# Patient Record
Sex: MALE | Race: WHITE | URBAN - METROPOLITAN AREA
[De-identification: names, ages, dates, MRNs, and addresses within clinical notes are randomized per-mention and may not be internally consistent; named-entity substitution may affect disease eponyms.]

---

## 2021-06-09 ENCOUNTER — HISTORICAL (OUTPATIENT)
Dept: ADMINISTRATIVE | Facility: HOSPITAL | Age: 86
End: 2021-06-09

## 2021-08-04 ENCOUNTER — HISTORICAL (OUTPATIENT)
Dept: ADMINISTRATIVE | Facility: HOSPITAL | Age: 86
End: 2021-08-04

## 2022-04-30 VITALS
BODY MASS INDEX: 23.84 KG/M2 | HEIGHT: 73 IN | WEIGHT: 179.88 LBS | DIASTOLIC BLOOD PRESSURE: 91 MMHG | SYSTOLIC BLOOD PRESSURE: 188 MMHG | DIASTOLIC BLOOD PRESSURE: 97 MMHG | SYSTOLIC BLOOD PRESSURE: 182 MMHG | BODY MASS INDEX: 23.84 KG/M2 | HEIGHT: 73 IN | WEIGHT: 179.88 LBS

## 2022-05-03 NOTE — HISTORICAL [facility]
This is a historical note converted from Shelley. Formatting and pictures may have been removed.  Please reference Shelley for original formatting and attached multimedia. Chief Complaint  3 MONTH F/U PINNING RIGHT FEM NECK FX, ?AMBULATING WITH CANE, NO COMPLAINTS  History of Present Illness  Patient is here today for a follow-up evaluation 3 months out for percutaneous pinning of right femoral neck fracture. ?He states he is doing very well.? He is ambulatory with a cane. ?He states that he is able to walk without his cane but carries it around for balance. ?He continues to work with home health physical therapy once a week and does?home exercises on the other days.? He has no pain?or any other complaints or issues to report today. ?He is very happy with his progress.  Review of Systems  Otherwise negative  Physical Exam  Vitals & Measurements  HR:?89(Peripheral)? RR:?20? BP:?188/97?  HT:?185.40?cm? WT:?81.600?kg? BMI:?23.74?  General: Awake, alert, oriented. Patient in no acute distress. Well nourished and well perfused.  Musculoskeletal:?  Right hip:  Incisions well-healed with no signs of infection  Good passive range of motion of the hip and knee without pain  No swelling to the lower extremity  No painful or prominent hardware  calf supple, non tender, no signs of deep vein thrombosis  dorsi and plantar flexion intact  motor intact to digits  palpable DP pulse  sensation to light touch intact distally  brisk capillary refill distally  Assessment/Plan  1.?Closed nondisplaced midcervical fracture of right femur?S72.034D  Ordered:  Clinic Follow up, *Est. 10/04/21 3:00:00 CDT, Order for future visit, Closed nondisplaced midcervical fracture of right femur, Orthopaedics  Office/Outpatient Visit Level 2 Established [id number] PC, Closed nondisplaced midcervical fracture of right femur, Orthopaedics Clinic, 08/04/21 13:23:00 CDT  XR Hip Right 2 Views, Routine, *Est. 10/04/21 3:00:00 CDT, None, Ambulatory, Rad Type,  Order for future visit, Closed nondisplaced midcervical fracture of right femur, Not Scheduled, *Est. 10/04/21 3:00:00 CDT  ?  Patient?is?doing very well 3 months out from percutaneous pinning of his?right femoral neck fracture. ?His x-rays are stable today?and his fracture appears to be healing appropriately. ?He continue activity as tolerated to the right lower extremity. ?Okay to wean from his cane?once his balance permits. ?He will continue to work with home health physical therapy once weekly for the next 2 months?and do home exercise program on his off days.? He is working on range of motion and strengthening exercises as well as gait training.? We will plan to see him back in 2 months for repeat x-rays and evaluation. ?All questions and concerns were addressed. ?The patient and his wife are very happy with his progress and they understand and agree with the plan of care.  ?  The above findings, diagnostics, and treatment plan were discussed with Dr. Lerma who is in agreement with the plan of care.?  Referrals  Clinic Follow up, *Est. 10/04/21 3:00:00 CDT, Order for future visit, Closed nondisplaced midcervical fracture of right femur, [facility]   Problem List/Past Medical History  Ongoing  Closed nondisplaced midcervical fracture of right femur  Historical  No qualifying data  Procedure/Surgical History  Pinning Percutaneous Hip (Right) (05/03/2021)  Reposition Right Upper Femur with Internal Fixation Device, Percutaneous Approach (05/03/2021)   Medications  gabapentin 100 mg oral capsule, 200 mg= 2 cap(s), Oral, QID  Keppra 500 mg oral tablet, 500 mg= 1 tab(s), Oral, BID,? ?Not Taking, Completed Rx  Pepcid 20 mg oral tablet, 20 mg= 1 tab(s), Oral, Daily  Senokot S 50 mg-8.6 mg oral tablet, 2 tab(s), Oral, BID,? ?Not Taking, Completed Rx  Allergies  No Known Allergies  Social History  Abuse/Neglect  No, 08/04/2021  Tobacco  Cigars or pipes daily within last 30 days, No, 08/04/2021  Family  History  Family history is negative  Health Maintenance  Health Maintenance  ???Pending?(in the next year)  ??? ??OverDue  ??? ? ? ?Influenza Vaccine due??10/01/20??and every 1??day(s)  ??? ? ? ?Smoking Cessation due??01/01/21??Variable frequency  ??? ? ? ?Cognitive Screening due??01/02/21??and every 1??year(s)  ??? ??Due?  ??? ? ? ?Lipid Screening due??08/04/21??Unknown Frequency  ??? ? ? ?Medicare Annual Wellness Exam due??08/04/21??and every 1??year(s)  ??? ? ? ?Pneumococcal Vaccine due??08/04/21??Unknown Frequency  ??? ? ? ?Tetanus Vaccine due??08/04/21??and every 10??year(s)  ??? ? ? ?Zoster Vaccine due??08/04/21??Unknown Frequency  ??? ??Due In Future?  ??? ? ? ?Obesity Screening not due until??01/01/22??and every 1??year(s)  ??? ? ? ?Advance Directive not due until??01/02/22??and every 1??year(s)  ??? ? ? ?Fall Risk Assessment not due until??01/02/22??and every 1??year(s)  ??? ? ? ?Functional Assessment not due until??01/02/22??and every 1??year(s)  ??? ? ? ?ADL Screening not due until??05/08/22??and every 1??year(s)  ???Satisfied?(in the past 1 year)  ??? ??Satisfied?  ??? ? ? ?ADL Screening on??05/08/21.??Satisfied by Shmuel Arauz  ??? ? ? ?Advance Directive on??05/02/21.??Satisfied by Birdie Alberto RN  ??? ? ? ?Blood Pressure Screening on??08/04/21.??Satisfied by Jaz Al  ??? ? ? ?Body Mass Index Check on??08/04/21.??Satisfied by Jaz Al  ??? ? ? ?Depression Screening on??08/04/21.??Satisfied by Jaz Al  ??? ? ? ?Diabetes Screening on??05/05/21.??Satisfied by Stephy Cid  ??? ? ? ?Fall Risk Assessment on??08/04/21.??Satisfied by Jaz Al  ??? ? ? ?Functional Assessment on??05/07/21.??Satisfied by Paige Ortiz PT  ??? ? ? ?Obesity Screening on??08/04/21.??Satisfied by Jaz Al  ?  Diagnostic Results  AP and lateral x-rays of the right hip?and AP of the pelvis: Hardware intact no failure loosening;?alignment maintained  compared to previous x-rays;?interval bone healing noted      Patient evaluated and discussed with Idania Hogan NP. I agree with her assessment and plan of care with any exceptions or additions noted.

## 2022-05-03 NOTE — HISTORICAL [facility]
This is a historical note converted from Shelley. Formatting and pictures may have been removed.  Please reference Shelley for original formatting and attached multimedia. Chief Complaint  5 week f/u crpp righ hip, ambulating with walker.  History of Present Illness  ?  Patient is here today for follow-up evaluation?status post right hip?percutaneous screw fixation?for valgus impacted femoral neck fracture.? He is doing very well today. ?He ambulates well with a walker. ?He has been working with physical therapy at home. ?He has no discomfort in the hip?to speak of.? He is happy with his progress.  ?  Review of Systems  ?  Otherwise negative  ?  Physical Exam  Vitals & Measurements  T:?36.7? ?C (Oral)? HR:?79(Peripheral)? RR:?20? BP:?182/91?  HT:?185.40?cm? WT:?81.600?kg? BMI:?23.74?  ?  Right lower extremity:?Surgical incision is well-healed.? Good range of motion of the hip, mild discomfort at the extremes of internal rotation. ?No calf swelling or tenderness, no signs of DVT. ?5 out of 5 motor strength distally. ?Ambulates well with his walker.  ?  Assessment/Plan  1.?Closed nondisplaced midcervical fracture of right femur?S72.034D  Ordered:  Clinic Follow up, *Est. 08/09/21 3:00:00 CDT, Order for future visit, Closed nondisplaced midcervical fracture of right femur, Orth\[facility]\""edics  Post-Op follow-up visit [id number] PC, Closed nondisplaced midcervical fracture of right femur, [facility], 06/09/21 14:04:00 CDT  XR Hip Right 2 Views, Routine, *Est. 08/09/21 3:00:00 CDT, Trauma, None, Ambulatory, Rad Type, Order for future visit, Closed nondisplaced midcervical fracture of right femur, Not Scheduled, *Est. 08/09/21 3:00:00 CDT  ?  ?  He is doing very well today?and I am very happy with his progress. ?He can continue to work with physical therapy on strengthening and range of motion.? Continue the use of the walker for balance. ?They understand that he is at risk for developing a nonunion of the femoral  neck?which could?collapse and require future operative intervention. ?We will continue to monitor?closely for this.? He will return to see us in 2 months for repeat x-rays of the right hip. ?He understands and agrees with all that we have discussed and all questions and concerns were addressed.  ?  Referrals  Clinic Follow up, *Est. 08/09/21 3:00:00 CDT, Order for future visit, Closed nondisplaced midcervical fracture of right femur, [facility]   Problem List/Past Medical History  Ongoing  Closed nondisplaced midcervical fracture of right femur  Historical  No qualifying data  Procedure/Surgical History  Pinning Percutaneous Hip (Right) (05/03/2021)  Reposition Right Upper Femur with Internal Fixation Device, Percutaneous Approach (05/03/2021)   Medications  acetaminophen-hydrocodone 325 mg-5 mg oral tablet, 1 tab(s), Oral, q4hr, PRN,? ?Not Taking, Completed Rx  gabapentin 100 mg oral capsule, 200 mg= 2 cap(s), Oral, QID  ibuprofen 600 mg oral tablet, 600 mg= 1 tab(s), Oral, TID, PRN,? ?Not Taking, Completed Rx  Keppra 500 mg oral tablet, 500 mg= 1 tab(s), Oral, BID,? ?Not Taking, Completed Rx  Lovenox, 30 mg= 0.3 mL, Subcutaneous, BID,? ?Not Taking, Completed Rx  Pepcid 20 mg oral tablet, 20 mg= 1 tab(s), Oral, Daily  Senokot S 50 mg-8.6 mg oral tablet, 2 tab(s), Oral, BID,? ?Not Taking, Completed Rx  traMADol 50 mg oral tablet, 50 mg= 1 tab(s), Oral, TID, PRN,? ?Not Taking, Completed Rx  Tylenol 325 mg oral tablet, 650 mg= 2 tab(s), Oral, q8hr,? ?Not Taking, Completed Rx  Allergies  No Known Allergies  Social History  Abuse/Neglect  No, 06/09/2021  Tobacco  Cigars or pipes daily within last 30 days, No, 06/09/2021  Family History  Family history is negative  Health Maintenance  Health Maintenance  ???Pending?(in the next year)  ??? ??OverDue  ??? ? ? ?Influenza Vaccine due??10/01/20??and every 1??day(s)  ??? ? ? ?Smoking Cessation due??01/01/21??Variable frequency  ??? ? ? ?Cognitive Screening  due??01/02/21??and every 1??year(s)  ??? ??Due?  ??? ? ? ?Lipid Screening due??06/09/21??Unknown Frequency  ??? ? ? ?Medicare Annual Wellness Exam due??06/09/21??and every 1??year(s)  ??? ? ? ?Pneumococcal Vaccine due??06/09/21??Unknown Frequency  ??? ? ? ?Tetanus Vaccine due??06/09/21??and every 10??year(s)  ??? ? ? ?Zoster Vaccine due??06/09/21??Unknown Frequency  ??? ??Due In Future?  ??? ? ? ?Obesity Screening not due until??01/01/22??and every 1??year(s)  ??? ? ? ?Advance Directive not due until??01/02/22??and every 1??year(s)  ??? ? ? ?Fall Risk Assessment not due until??01/02/22??and every 1??year(s)  ??? ? ? ?Functional Assessment not due until??01/02/22??and every 1??year(s)  ??? ? ? ?ADL Screening not due until??05/08/22??and every 1??year(s)  ???Satisfied?(in the past 1 year)  ??? ??Satisfied?  ??? ? ? ?ADL Screening on??05/08/21.??Satisfied by Shmuel Arauz  ??? ? ? ?Advance Directive on??05/02/21.??Satisfied by Birdie Alberto RN  ??? ? ? ?Blood Pressure Screening on??06/09/21.??Satisfied by Jaz Al  ??? ? ? ?Body Mass Index Check on??06/09/21.??Satisfied by Jaz Al  ??? ? ? ?Depression Screening on??06/09/21.??Satisfied by Jaz Al  ??? ? ? ?Diabetes Screening on??05/05/21.??Satisfied by Stephy Cid  ??? ? ? ?Fall Risk Assessment on??06/09/21.??Satisfied by Jaz Al.  ??? ? ? ?Functional Assessment on??05/07/21.??Satisfied by Paige Ortiz PT  ??? ? ? ?Obesity Screening on??06/09/21.??Satisfied by Jaz Al.  ?  Diagnostic Results  Right hip?2 views:?Hardware intact. ?Alignment maintained. ?No changes compared to postoperative evaluation.

## 2025-03-28 ENCOUNTER — HOSPITAL ENCOUNTER (INPATIENT)
Facility: HOSPITAL | Age: OVER 89
LOS: 1 days | Discharge: NURSING, REHAB OR LONG-TERM CARE | DRG: 054 | End: 2025-03-31
Attending: EMERGENCY MEDICINE | Admitting: INTERNAL MEDICINE
Payer: MEDICARE

## 2025-03-28 DIAGNOSIS — I63.9 CEREBROVASCULAR ACCIDENT (CVA), UNSPECIFIED MECHANISM: Primary | ICD-10-CM

## 2025-03-28 DIAGNOSIS — I63.9 CVA (CEREBRAL VASCULAR ACCIDENT): ICD-10-CM

## 2025-03-28 DIAGNOSIS — I10 BENIGN ESSENTIAL HTN: ICD-10-CM

## 2025-03-28 DIAGNOSIS — R07.9 CHEST PAIN: ICD-10-CM

## 2025-03-28 DIAGNOSIS — I63.9 STROKE: ICD-10-CM

## 2025-03-28 PROBLEM — R53.1 LEFT-SIDED WEAKNESS: Status: ACTIVE | Noted: 2025-03-28

## 2025-03-28 LAB
CREAT SERPL-MCNC: 0.67 MG/DL (ref 0.72–1.25)
ERYTHROCYTE [DISTWIDTH] IN BLOOD BY AUTOMATED COUNT: 13.7 % (ref 11.5–17)
GFR SERPLBLD CREATININE-BSD FMLA CKD-EPI: >60 ML/MIN/1.73/M2
HCT VFR BLD AUTO: 43.6 % (ref 42–52)
HGB BLD-MCNC: 14.2 G/DL (ref 14–18)
MCH RBC QN AUTO: 30.1 PG (ref 27–31)
MCHC RBC AUTO-ENTMCNC: 32.6 G/DL (ref 33–36)
MCV RBC AUTO: 92.6 FL (ref 80–94)
NRBC BLD AUTO-RTO: 0 %
PLATELET # BLD AUTO: 366 X10(3)/MCL (ref 130–400)
PMV BLD AUTO: 10.5 FL (ref 7.4–10.4)
RBC # BLD AUTO: 4.71 X10(6)/MCL (ref 4.7–6.1)
WBC # BLD AUTO: 6.28 X10(3)/MCL (ref 4.5–11.5)

## 2025-03-28 PROCEDURE — 63600175 PHARM REV CODE 636 W HCPCS: Performed by: HOSPITALIST

## 2025-03-28 PROCEDURE — G0378 HOSPITAL OBSERVATION PER HR: HCPCS

## 2025-03-28 PROCEDURE — 93010 ELECTROCARDIOGRAM REPORT: CPT | Mod: ,,, | Performed by: INTERNAL MEDICINE

## 2025-03-28 PROCEDURE — 85027 COMPLETE CBC AUTOMATED: CPT | Performed by: HOSPITALIST

## 2025-03-28 PROCEDURE — 96372 THER/PROPH/DIAG INJ SC/IM: CPT | Performed by: HOSPITALIST

## 2025-03-28 PROCEDURE — 99285 EMERGENCY DEPT VISIT HI MDM: CPT | Mod: 25

## 2025-03-28 PROCEDURE — 99223 1ST HOSP IP/OBS HIGH 75: CPT | Mod: FS,,, | Performed by: PSYCHIATRY & NEUROLOGY

## 2025-03-28 PROCEDURE — 82565 ASSAY OF CREATININE: CPT | Performed by: HOSPITALIST

## 2025-03-28 PROCEDURE — 93005 ELECTROCARDIOGRAM TRACING: CPT

## 2025-03-28 RX ORDER — GLUCAGON 1 MG
1 KIT INJECTION
Status: DISCONTINUED | OUTPATIENT
Start: 2025-03-28 | End: 2025-03-31 | Stop reason: HOSPADM

## 2025-03-28 RX ORDER — SODIUM CHLORIDE 0.9 % (FLUSH) 0.9 %
10 SYRINGE (ML) INJECTION EVERY 12 HOURS PRN
Status: DISCONTINUED | OUTPATIENT
Start: 2025-03-28 | End: 2025-03-31 | Stop reason: HOSPADM

## 2025-03-28 RX ORDER — NAPROXEN SODIUM 220 MG/1
81 TABLET, FILM COATED ORAL DAILY
Status: DISCONTINUED | OUTPATIENT
Start: 2025-03-29 | End: 2025-03-31 | Stop reason: HOSPADM

## 2025-03-28 RX ORDER — ENOXAPARIN SODIUM 100 MG/ML
40 INJECTION SUBCUTANEOUS EVERY 24 HOURS
Status: DISCONTINUED | OUTPATIENT
Start: 2025-03-28 | End: 2025-03-31 | Stop reason: HOSPADM

## 2025-03-28 RX ORDER — IBUPROFEN 200 MG
24 TABLET ORAL
Status: DISCONTINUED | OUTPATIENT
Start: 2025-03-28 | End: 2025-03-31 | Stop reason: HOSPADM

## 2025-03-28 RX ORDER — IBUPROFEN 200 MG
16 TABLET ORAL
Status: DISCONTINUED | OUTPATIENT
Start: 2025-03-28 | End: 2025-03-31 | Stop reason: HOSPADM

## 2025-03-28 RX ORDER — HYDRALAZINE HYDROCHLORIDE 20 MG/ML
10 INJECTION INTRAMUSCULAR; INTRAVENOUS EVERY 4 HOURS PRN
Status: DISCONTINUED | OUTPATIENT
Start: 2025-03-28 | End: 2025-03-31 | Stop reason: HOSPADM

## 2025-03-28 RX ORDER — AMLODIPINE BESYLATE 5 MG/1
5 TABLET ORAL DAILY
Status: DISCONTINUED | OUTPATIENT
Start: 2025-03-29 | End: 2025-03-31 | Stop reason: HOSPADM

## 2025-03-28 RX ORDER — LABETALOL HYDROCHLORIDE 5 MG/ML
10 INJECTION, SOLUTION INTRAVENOUS EVERY 4 HOURS PRN
Status: DISCONTINUED | OUTPATIENT
Start: 2025-03-28 | End: 2025-03-31 | Stop reason: HOSPADM

## 2025-03-28 RX ORDER — BISACODYL 10 MG/1
10 SUPPOSITORY RECTAL DAILY PRN
Status: DISCONTINUED | OUTPATIENT
Start: 2025-03-28 | End: 2025-03-31 | Stop reason: HOSPADM

## 2025-03-28 RX ORDER — NALOXONE HCL 0.4 MG/ML
0.02 VIAL (ML) INJECTION
Status: DISCONTINUED | OUTPATIENT
Start: 2025-03-28 | End: 2025-03-31 | Stop reason: HOSPADM

## 2025-03-28 RX ORDER — FOLIC ACID 1 MG/1
1 TABLET ORAL DAILY
Status: DISCONTINUED | OUTPATIENT
Start: 2025-03-29 | End: 2025-03-31 | Stop reason: HOSPADM

## 2025-03-28 RX ORDER — THIAMINE HCL 100 MG
100 TABLET ORAL DAILY
Status: DISCONTINUED | OUTPATIENT
Start: 2025-03-29 | End: 2025-03-31 | Stop reason: HOSPADM

## 2025-03-28 RX ADMIN — ENOXAPARIN SODIUM 40 MG: 40 INJECTION SUBCUTANEOUS at 07:03

## 2025-03-28 NOTE — H&P
[facility]  Hospital Medicine History & Physical Examination       Patient Name: Manjit Reynolds  MRN: [id number]  Patient Class: OP- Observation   Admission Date: 3/28/2025   Admitting Physician:  Service   Attending Physician: Александр Schofield MD  Primary Care Provider: No primary care provider on file.  Face-to-Face encounter date: 03/28/2025  Code Status:Code Status Discussion Note   Chief Complaint: Transfer (Arrives via [facility]. Transfer from [facility] for stroke. LSN 2200 yesterday. L side weakness and left facial droop. )         Patient information was obtained from patient, patient's family, past medical records and ER records.     HISTORY OF PRESENT ILLNESS:   Manjit Reynolds is a 89 y.o. male who  has no past medical history on file.. The patient presented to [facility] on 3/28/2025     89-year-old male who was transferred from an outside facility secondary to left-sided weakness and left facial droop.  Weakness began suddenly night prior to arrival.  He also reported headache.  Patient did have some mild weakness prior secondary to a previous stroke.  Imaging performed at outside facility showed a included rate P1 segment.  He was transferred to [facility] neurology consultation.  Headache has subsided.  No further facial droop.  Neurology has already evaluated.  Recommending aspirin load and then maybe aspirin daily.  Checking MRI but no planned intervention at this time.  Okay to normalize his blood pressure.    Vital signs on arrival showed a elevated blood pressure home 74/91.  He was mildly tachypneic but on room air.  No labs have been drawn but reported as normal at outside facility.  He is being admitted to the hospitalist group.  Will get his home medications and resume as appropriate.  PT OT evaluation.      PAST MEDICAL HISTORY:     Essential Hypertension  Prostate Cancer  Osteoarthritis    PAST SURGICAL HISTORY:     No pertinent pshx    ALLERGIES:     Sulfa    FAMILY HISTORY:  "  Reviewed and negative    SOCIAL HISTORY:     Ongoing tobacco use, no significant alcohol.    HOME MEDICATIONS:     Prior to Admission medications    Not on File     [facility]: Folate, thiamine, Buspar, and chlordiazepoxide.  Will need to verify with patients pharmacy    REVIEW OF SYSTEMS:   Except as documented, all other systems reviewed and negative     PHYSICAL EXAM:     VITAL SIGNS: 24 HRS MIN & MAX LAST   Temp  Min: 98.4 °F (36.9 °C)  Max: 98.4 °F (36.9 °C) 98.4 °F (36.9 °C)   BP  Min: 174/91  Max: 174/91 (!) 174/91   Pulse  Min: 89  Max: 89  89   Resp  Min: 13  Max: 13 13   SpO2  Min: 98 %  Max: 98 % 98 %       General appearance: Well-developed, well-nourished male in no apparent distress.  HENT: Atraumatic head. Moist mucous membranes of oral cavity.  Eyes: Normal extraocular movements.   Neck: Supple.   Lungs: Clear to auscultation bilaterally. No wheezing present.   Heart: Regular rate and rhythm. S1 and S2 present with no murmurs/gallop/rub. No pedal edema. No JVD present.   Abdomen: Soft, non-distended, non-tender. No rebound tenderness/guarding. Bowel sounds are normal.   Extremities: No cyanosis, clubbing, or edema.  Skin: No Rash.   Neuro: Motor and sensory exams grossly intact. Good tone. LLE 4/5 strength  Psych/mental status: Appropriate mood and affect. Responds appropriately to questions.     LABS AND IMAGING:     No results for input(s): "WBC", "RBC", "HGB", "HCT", "MCV", "MCH", "MCHC", "RDW", "PLT", "MPV", "GRAN", "LYMPH", "MONO", "BASO", "NRBC" in the last 168 hours.    No results for input(s): "NA", "K", "CL", "CO2", "ANIONGAP", "BUN", "CREATININE", "GLU", "CALCIUM", "PH", "MG", "ALBUMIN", "PROT", "ALKPHOS", "ALT", "AST", "BILITOT" in the last 168 hours.    Microbiology Results (last 7 days)       ** No results found for the last 168 hours. **             X-Ray Hip 2 or 3 views Right (with Pelvis when performed)  Please see provider office/clinic notes for radiology " interpretation.                                   Date/Time: 08/04/2021 15:39    Electronically Signed By: Jose Rafael Sandhu Jr.  Date/Time Signed: 08/04/2021 15:39      _____________________________________  INPATIENT LIST OF MEDICATIONS   Current Medications[1]      Scheduled Meds:    Continuous Infusions:  PRN Meds:.      VTE Prophylaxis: will be placed on appropriate DVT prophylaxis and will be advised to be as mobile as possible and sit in a chair as tolerated  _____________________________________    ASSESSMENT & PLAN:   Occluded R P1 segment  Partially calcified anterior falcine meningioma  LLE weakness  Tobacco dependence  H/O: HTN, prostate cancer, SDH    Appreciate Neurology recommendations.  Checking an MRI but if negative no further intervention is needed.    Continue with baby aspirin daily.  Already given 300 mg today.    Will get PT OT evaluation.    Normalize blood pressure and adjust as needed.  Will resume his home medications   Nicotine patch available   Discharge disposition pending PT evaluation.  May benefit from some rehab versus home health    Patient is placed under observation under my order on 03/28/2025      Александр Schofield MD  4:03 PM 03/28/2025    Screening for Social Drivers for health:  Patient screened for food insecurity, housing instability, transportation needs, utility difficulties, and interpersonal safety (select all that apply as identified as concern)  []Housing or Food  []Transportation Needs  []Utility Difficulties  []Interpersonal safety  [x]None             [1] No current facility-administered medications for this encounter.  No current outpatient medications on file.

## 2025-03-28 NOTE — ASSESSMENT & PLAN NOTE
- presented with left leg weakness, left facial droop and sensory loss  - Stroke RF: HTN, prostate CA, tobacco dependence  - Intervention: None-- OOW for TNK, No LVO  - Etiology: TBD    Stroke workup:  -CTh and CTA h/n from outlying facility on 3/28/2025: Occluded right P1 segment, suspected partially calcified anterior falcine meningioma   -MRI brain: pending       NIH Stroke Scale      1a  Level of consciousness: 0=alert; keenly responsive   1b. LOC questions:  0=Answers both tasks correctly   1c. LOC commands: 0=Answers both tasks correctly   2.  Best Gaze: 0=normal   3.  Visual: 0=No visual loss   4. Facial Palsy: 1=Minor paralysis (flattened nasolabial fold, asymmetric on smiling)   5a.  Motor left arm: 0=No drift, limb holds 90 (or 45) degrees for full 10 seconds   5b.  Motor right arm: 0=No drift, limb holds 90 (or 45) degrees for full 10 seconds   6a. motor left le=No drift, limb holds 90 (or 45) degrees for full 10 seconds   6b  Motor right le=No drift, limb holds 90 (or 45) degrees for full 10 seconds   7. Limb Ataxia: 0=Absent   8.  Sensory: 1=Mild to moderate sensory loss; patient feels pinprick is less sharp or is dull on the affected side; there is a loss of superficial pain with pinprick but patient is aware He is being touched   9. Best Language:  0=No aphasia, normal   10. Dysarthria: 0=Normal   11. Extinction and Inattention: 0=No abnormality   12. Distal motor function: 0=Normal    Total:   2         Plan:  -low suspicion for stroke at this point  -continue stroke workup with MRI brain without contrast and MRI cervical spine without contrast  -OK to discharge home if MRI negative  -recommend aspirin 81mg daily  -ok to normalize blood pressure

## 2025-03-28 NOTE — CONSULTS
[facility] - Emergency Dept  Neurology  Consult Note    Patient Name: Manjit Reynolds  MRN: [id number]  Admission Date: 3/28/2025  Hospital Length of Stay: 0 days  Code Status: No Order   Attending Provider: Jocelyn Staton MD   Consulting Provider: Merline Richardson NP  Primary Care Physician: No primary care provider on file.  Principal Problem:<principal problem not specified>    Inpatient consult to Neurology Services (Vascular Neurology)  Consult performed by: Merline Richardson NP  Consult ordered by: Jocelyn Staton MD         Subjective:     Chief Complaint:    Chief Complaint   Patient presents with    Transfer     Arrives via [employer]. Transfer from [facility] for stroke. LSN 2200 yesterday. L side weakness and left facial droop.          HPI:   Manjit Reynolds is a 89 y.o. male with past medical history significant for traumatic SDH, HTN, migraines and prostate cancer who presented to an [facility] ED on 3/28/2025 with complaints of left leg weakness that began around 2200 on 3/27/2025.  He reports having frequent left-sided headaches and falls.  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  Patient was transferred to [facility] for vascular neurology consult.         On exam this afternoon (3/28/2025), patient is seen lying in bed resting comfortably.  He reports having issues raising his left leg and states it is much weaker than the right which is not his baseline.  He endorsed a left-sided headache this week which is now resolved.  On exam he does not have a drift or reproducible unilateral weakness, but he does have a left-sided facial droop with left facial sensory loss.     No past medical history on file.    No past surgical history on file.    Review of patient's allergies indicates:  No Known Allergies    Current Neurological Medications: Aspirin    No current facility-administered medications on file prior  to encounter.     No current outpatient medications on file prior to encounter.     Family History    None       Tobacco Use    Smoking status: Not on file    Smokeless tobacco: Not on file   Substance and Sexual Activity    Alcohol use: Not on file    Drug use: Not on file    Sexual activity: Not on file     Review of Systems   Neurological:  Positive for weakness, numbness and headaches. Negative for dizziness, tremors, seizures and speech difficulty.        Patient reports having left-sided migraines at least 3 times per week. He had a migraine last night but it has resolved.  He reports left leg weakness and numbness to the left side of his face.   All other systems reviewed and are negative.    Objective:     Vital Signs (Most Recent):  Temp: 98.4 °F (36.9 °C) (03/28/25 1321)  Pulse: 89 (03/28/25 1321)  Resp: 13 (03/28/25 1321)  BP: (!) 174/91 (03/28/25 1321)  SpO2: 98 % (03/28/25 1321) Vital Signs (24h Range):  Temp:  [98.4 °F (36.9 °C)] 98.4 °F (36.9 °C)  Pulse:  [89] 89  Resp:  [13] 13  SpO2:  [98 %] 98 %  BP: (174)/(91) 174/91     Weight: 67.9 kg (149 lb 12.8 oz)  Body mass index is 19.77 kg/m².     Physical Exam  Vitals and nursing note reviewed.   Constitutional:       General: He is not in acute distress.     Appearance: Normal appearance. He is not ill-appearing.   HENT:      Head: Normocephalic and atraumatic.   Eyes:      General: No visual field deficit.     Extraocular Movements: Extraocular movements intact.      Pupils: Pupils are equal, round, and reactive to light.   Pulmonary:      Effort: Pulmonary effort is normal. No respiratory distress.   Musculoskeletal:         General: No swelling. Normal range of motion.      Cervical back: Normal range of motion. No rigidity or tenderness.      Right lower leg: No edema.      Left lower leg: No edema.   Skin:     General: Skin is warm and dry.      Capillary Refill: Capillary refill takes less than 2 seconds.   Neurological:      Mental Status: He is  alert and oriented to person, place, and time. Mental status is at baseline.      GCS: GCS eye subscore is 4. GCS verbal subscore is 5. GCS motor subscore is 6.      Cranial Nerves: Facial asymmetry present. No dysarthria.      Sensory: Sensory deficit present.      Motor: No weakness, tremor, abnormal muscle tone, seizure activity or pronator drift.      Coordination: Heel to Shin Test normal.      Comments: Patient has left facial droop with sensory loss to the left side of face.  No unilateral weakness or any other focal neurological deficits.   Psychiatric:         Mood and Affect: Mood normal.         Behavior: Behavior normal.          NEUROLOGICAL EXAMINATION:     MENTAL STATUS   Oriented to person, place, and time.     CRANIAL NERVES     CN III, IV, VI   Pupils are equal, round, and reactive to light.      Significant Labs: All pertinent lab results from the past 24 hours have been reviewed.    Significant Imaging: I have reviewed all pertinent imaging results/findings within the past 24 hours.  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 LOC questions:  0=Answers both tasks correctly   1c. LOC commands: 0=Answers both tasks correctly   2.  Best Gaze: 0=normal   3.  Visual: 0=No visual loss   4. Facial Palsy: 1=Minor paralysis (flattened nasolabial fold, asymmetric on smiling)   5a.  Motor left arm: 0=No drift, limb holds 90 (or 45) degrees for full 10 seconds   5b.  Motor right arm: 0=No drift, limb holds 90 (or 45) degrees for full 10 seconds   6a. motor left  le=No drift, limb holds 90 (or 45) degrees for full 10 seconds   6b  Motor right le=No drift, limb holds 90 (or 45) degrees for full 10 seconds   7. Limb Ataxia: 0=Absent   8.  Sensory: 1=Mild to moderate sensory loss; patient feels pinprick is less sharp or is dull on the affected side; there is a loss of superficial pain with pinprick but patient is aware He is being touched   9. Best Language:  0=No aphasia, normal   10. Dysarthria: 0=Normal   11. Extinction and Inattention: 0=No abnormality   12. Distal motor function: 0=Normal    Total:   2         Plan:  -low suspicion for stroke at this point  -continue stroke workup with MRI brain without contrast and MRI cervical spine without contrast  -OK to discharge home if MRI negative  -ok to normalize blood pressure  -recommend Aspirin 300mg today and then Aspirin 81mg daily        VTE Risk Mitigation (From admission, onward)      None                Merline Richardson NP  Neurology  [facility] - Emergency Dept

## 2025-03-28 NOTE — HPI
Manjit Reynolds is a 89 y.o. male with past medical history significant for traumatic SDH, HTN, migraines and prostate cancer who presented to an outlying ED on 3/28/2025 with complaints of left leg weakness that began around 2200 on 3/27/2025.  He reports having frequent left-sided headaches and falls.  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  Patient was transferred to [facility] for vascular neurology consult.         On exam this afternoon (3/28/2025), patient is seen lying in bed resting comfortably.  He reports having issues raising his left leg and states it is much weaker than the right which is not his baseline.  He endorsed a left-sided headache this week which is now resolved.  On exam he does not have a drift or reproducible unilateral weakness, but he does have a left-sided facial droop with left facial sensory loss.

## 2025-03-28 NOTE — SUBJECTIVE & OBJECTIVE
No past medical history on file.    No past surgical history on file.    Review of patient's allergies indicates:  No Known Allergies    Current Neurological Medications: Aspirin    No current facility-administered medications on file prior to encounter.     No current outpatient medications on file prior to encounter.     Family History    None       Tobacco Use    Smoking status: Not on file    Smokeless tobacco: Not on file   Substance and Sexual Activity    Alcohol use: Not on file    Drug use: Not on file    Sexual activity: Not on file     Review of Systems   Neurological:  Positive for weakness, numbness and headaches. Negative for dizziness, tremors, seizures and speech difficulty.        Patient reports having left-sided migraines at least 3 times per week. He had a migraine last night but it has resolved.  He reports left leg weakness and numbness to the left side of his face.   All other systems reviewed and are negative.    Objective:     Vital Signs (Most Recent):  Temp: 98.4 °F (36.9 °C) (03/28/25 1321)  Pulse: 89 (03/28/25 1321)  Resp: 13 (03/28/25 1321)  BP: (!) 174/91 (03/28/25 1321)  SpO2: 98 % (03/28/25 1321) Vital Signs (24h Range):  Temp:  [98.4 °F (36.9 °C)] 98.4 °F (36.9 °C)  Pulse:  [89] 89  Resp:  [13] 13  SpO2:  [98 %] 98 %  BP: (174)/(91) 174/91     Weight: 67.9 kg (149 lb 12.8 oz)  Body mass index is 19.77 kg/m².     Physical Exam  Vitals and nursing note reviewed.   Constitutional:       General: He is not in acute distress.     Appearance: Normal appearance. He is not ill-appearing.   HENT:      Head: Normocephalic and atraumatic.   Eyes:      General: No visual field deficit.     Extraocular Movements: Extraocular movements intact.      Pupils: Pupils are equal, round, and reactive to light.   Pulmonary:      Effort: Pulmonary effort is normal. No respiratory distress.   Musculoskeletal:         General: No swelling. Normal range of motion.      Cervical back: Normal range of motion.  No rigidity or tenderness.      Right lower leg: No edema.      Left lower leg: No edema.   Skin:     General: Skin is warm and dry.      Capillary Refill: Capillary refill takes less than 2 seconds.   Neurological:      Mental Status: He is alert and oriented to person, place, and time. Mental status is at baseline.      GCS: GCS eye subscore is 4. GCS verbal subscore is 5. GCS motor subscore is 6.      Cranial Nerves: Facial asymmetry present. No dysarthria.      Sensory: Sensory deficit present.      Motor: No weakness, tremor, abnormal muscle tone, seizure activity or pronator drift.      Coordination: Heel to Shin Test normal.      Comments: Patient has left facial droop with sensory loss to the left side of face.  No unilateral weakness or any other focal neurological deficits.   Psychiatric:         Mood and Affect: Mood normal.         Behavior: Behavior normal.          NEUROLOGICAL EXAMINATION:     MENTAL STATUS   Oriented to person, place, and time.     CRANIAL NERVES     CN III, IV, VI   Pupils are equal, round, and reactive to light.      Significant Labs: All pertinent lab results from the past 24 hours have been reviewed.    Significant Imaging: I have reviewed all pertinent imaging results/findings within the past 24 hours.

## 2025-03-28 NOTE — ED PROVIDER NOTES
Encounter Date: 3/28/2025    SCRIBE #1 NOTE: I, Rosana Jimenez, am scribing for, and in the presence of,  Joceyln Staton MD. I have scribed the following portions of the note - Other sections scribed: HPI, ROS, PE.       History     Chief Complaint   Patient presents with    Transfer     Arrives via [employer]. Transfer from [facility] for stroke. LSN 2200 yesterday. L side weakness and left facial droop.      Patient is a 89 year old male with no known history presenting to the ED as a transfer from [place] for a stroke yesterday. Patient presents with left sided weakness and left facial droop. Last seen normal 2200 yesterday. He claims his weakness began last night, being unable to raise his left leg. Patient also endorses a headache on the left side for a couple weeks along with occasional falling.     The history is provided by the patient and the EMS personnel. No  was used.     Review of patient's allergies indicates:   Allergen Reactions    Sulfa (sulfonamide antibiotics)      No past medical history on file.  No past surgical history on file.  No family history on file.  Social History[1]  Review of Systems   Constitutional:  Negative for chills, diaphoresis and fever.        Left sided weakness.  Endorses occasional falling.    HENT:  Negative for congestion, ear pain, sinus pain and sore throat.    Eyes:  Negative for pain, discharge and visual disturbance.   Respiratory:  Negative for cough, shortness of breath, wheezing and stridor.    Cardiovascular:  Negative for chest pain and palpitations.   Gastrointestinal:  Negative for abdominal pain, constipation, diarrhea, nausea, rectal pain and vomiting.   Genitourinary:  Negative for dysuria and hematuria.   Musculoskeletal:  Negative for back pain and myalgias.   Skin:  Negative for rash.   Neurological:  Positive for headaches (On the left side for the past couple weeks.). Negative for dizziness, syncope and numbness.        Unable  to raise left leg.   Facial droop.    Hematological: Negative.    Psychiatric/Behavioral: Negative.     All other systems reviewed and are negative.      Physical Exam     Initial Vitals [03/28/25 1321]   BP Pulse Resp Temp SpO2   (!) 174/91 89 13 98.4 °F (36.9 °C) 98 %      MAP       --         Physical Exam    Nursing note and vitals reviewed.  Constitutional: He appears well-developed and well-nourished. He is not diaphoretic. No distress.   HENT:   Head: Normocephalic and atraumatic.   Nose: Nose normal. Mouth/Throat: Oropharynx is clear and moist.   Eyes: Conjunctivae and EOM are normal. Pupils are equal, round, and reactive to light.   Neck: Trachea normal. Neck supple.   Normal range of motion.  Cardiovascular:  Normal rate, regular rhythm, normal heart sounds and intact distal pulses.     Exam reveals no gallop and no friction rub.       No murmur heard.  Pulmonary/Chest: Breath sounds normal. No respiratory distress. He has no wheezes. He has no rhonchi. He has no rales. He exhibits no tenderness.   Abdominal: Abdomen is soft. Bowel sounds are normal. He exhibits no distension and no mass. There is no abdominal tenderness. There is no rebound.   Musculoskeletal:         General: No tenderness or edema. Normal range of motion.      Cervical back: Normal range of motion and neck supple.      Lumbar back: Normal. No tenderness. Normal range of motion.     Neurological: He is alert. No cranial nerve deficit.   Slight drift to the left upper and lower extremities with ataxia.    Skin: Skin is warm and dry. Capillary refill takes less than 2 seconds. No rash and no abscess noted. No erythema. No pallor.   Psychiatric: He has a normal mood and affect. His behavior is normal. Judgment and thought content normal.         ED Course   Procedures  Labs Reviewed   CBC WITHOUT DIFFERENTIAL - Abnormal       Result Value    WBC 6.28      RBC 4.71      Hgb 14.2      Hct 43.6      MCV 92.6      MCH 30.1      MCHC 32.6 (*)      RDW 13.7      Platelet 366      MPV 10.5 (*)     NRBC% 0.0     CREATININE, SERUM          Imaging Results    None          Medications   sodium chloride 0.9% flush 10 mL (has no administration in time range)   naloxone 0.4 mg/mL injection 0.02 mg (has no administration in time range)   glucose chewable tablet 16 g (has no administration in time range)   glucose chewable tablet 24 g (has no administration in time range)   dextrose 50% injection 12.5 g (has no administration in time range)   dextrose 50% injection 25 g (has no administration in time range)   glucagon (human recombinant) injection 1 mg (has no administration in time range)   enoxaparin injection 40 mg (has no administration in time range)   thiamine tablet 100 mg (has no administration in time range)   folic acid tablet 1 mg (has no administration in time range)   hydrALAZINE injection 10 mg (has no administration in time range)   labetaloL injection 10 mg (has no administration in time range)   aspirin chewable tablet 81 mg (has no administration in time range)   amLODIPine tablet 5 mg (has no administration in time range)     Medical Decision Making  Differential diagnosis includes, but is not limited to, CVA and TIA.   Reviewed labs and imaging, discusse dwith neurointerventional. No intervention, will admit for routine stroke care    Problems Addressed:  Benign essential HTN: chronic illness or injury  Cerebrovascular accident (CVA), unspecified mechanism: acute illness or injury that poses a threat to life or bodily functions    Amount and/or Complexity of Data Reviewed  Independent Historian: EMS     Details: Patient is a 89 year old male with no known history presenting to the ED as a transfer from [place] for a stroke yesterday. Patient presents with left sided weakness and left facial droop. Last seen normal 2200 yesterday.   External Data Reviewed: labs and radiology.  Radiology: ordered and independent interpretation  performed.    Risk  OTC drugs.  Prescription drug management.  Decision regarding hospitalization.      Additional MDM:     NIH Stroke Scale:   Interval = initial 15 minute assessment from arrival  Level of consciousness = 0 - alert  LOC questions = 1 - answers one correctly  LOC commands = 0 - performs both correctly  Best gaze = 0 - normal  Visual = 0 - no visual loss  Facial palsy = 0 - normal  Motor left arm =  1 - drift  Motor right arm =  0 - no drift  Motor left leg = 1 - drift  Motor right leg =  0 - no drift  Limb ataxia = 2 - present in two limbs  Sensory = 1 - mild to moderate loss  Best language = 0 - no aphasia  Dysarthria = 0 - normal articulation  Extinction and inattention = 0 - no neglect  NIH Stroke Scale Total = 6           Scribe Attestation:   Scribe #1: I performed the above scribed service and the documentation accurately describes the services I performed. I attest to the accuracy of the note.  Comments: Attending:   Physician Attestation Statement for Scribe #1: Jocelyn LEE MD, personally performed the services described in this documentation. All medical record entries made by the scribe were at my direction and in my presence.  I have reviewed the chart and agree that the record reflects my personal performance and is accurate and complete.        Attending Attestation:           Physician Attestation for Scribe:  Physician Attestation Statement for Scribe #1: Brionna LEE Brooke R, MD, reviewed documentation, as scribed by Rosana Jimenez in my presence, and it is both accurate and complete.             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5 AST 24 ALT 13 alk-phos 70 troponin less than 0.012 ethanol negative [BS]   1408 Dr seth will come see pt [BS]   1422 No intervention per neuro interventional [BS]   1457 Dr bello recommends mris no acute intervention [BS]   1547 Hospitalist consulted [BS]      ED Course User Index  [BS] Jocelyn Staton MD                           Clinical Impression:  Final diagnoses:  [I63.9] Cerebrovascular accident (CVA), unspecified mechanism (Primary)  [I10] Benign essential HTN          ED Disposition Condition    Observation Stable                    Jocelyn Staton MD  03/28/25 1432       [1]   Social History  Tobacco Use    Smoking status: Every Day     Types: Cigars        Jocelyn Staton MD  03/28/25 2156

## 2025-03-29 LAB
ALBUMIN SERPL-MCNC: 3.3 G/DL (ref 3.4–4.8)
ALBUMIN/GLOB SERPL: 0.9 RATIO (ref 1.1–2)
ALP SERPL-CCNC: 63 UNIT/L (ref 40–150)
ALT SERPL-CCNC: 9 UNIT/L (ref 0–55)
ANION GAP SERPL CALC-SCNC: 6 MEQ/L
AST SERPL-CCNC: 12 UNIT/L (ref 11–45)
BASOPHILS # BLD AUTO: 0.08 X10(3)/MCL
BASOPHILS NFR BLD AUTO: 1.3 %
BILIRUB SERPL-MCNC: 0.5 MG/DL
BUN SERPL-MCNC: 15.2 MG/DL (ref 8.4–25.7)
CALCIUM SERPL-MCNC: 9.1 MG/DL (ref 8.8–10)
CHLORIDE SERPL-SCNC: 106 MMOL/L (ref 98–111)
CHOLEST SERPL-MCNC: 113 MG/DL
CHOLEST/HDLC SERPL: 3 {RATIO} (ref 0–5)
CO2 SERPL-SCNC: 27 MMOL/L (ref 23–31)
CREAT SERPL-MCNC: 0.64 MG/DL (ref 0.72–1.25)
CREAT/UREA NIT SERPL: 24
EOSINOPHIL # BLD AUTO: 0.16 X10(3)/MCL (ref 0–0.9)
EOSINOPHIL NFR BLD AUTO: 2.6 %
ERYTHROCYTE [DISTWIDTH] IN BLOOD BY AUTOMATED COUNT: 13.7 % (ref 11.5–17)
GFR SERPLBLD CREATININE-BSD FMLA CKD-EPI: >60 ML/MIN/1.73/M2
GLOBULIN SER-MCNC: 3.5 GM/DL (ref 2.4–3.5)
GLUCOSE SERPL-MCNC: 90 MG/DL (ref 75–121)
HCT VFR BLD AUTO: 39.6 % (ref 42–52)
HDLC SERPL-MCNC: 37 MG/DL (ref 35–60)
HGB BLD-MCNC: 13.4 G/DL (ref 14–18)
IMM GRANULOCYTES # BLD AUTO: 0.02 X10(3)/MCL (ref 0–0.04)
IMM GRANULOCYTES NFR BLD AUTO: 0.3 %
LDLC SERPL CALC-MCNC: 65 MG/DL (ref 50–140)
LYMPHOCYTES # BLD AUTO: 1.36 X10(3)/MCL (ref 0.6–4.6)
LYMPHOCYTES NFR BLD AUTO: 21.7 %
MCH RBC QN AUTO: 30.8 PG (ref 27–31)
MCHC RBC AUTO-ENTMCNC: 33.8 G/DL (ref 33–36)
MCV RBC AUTO: 91 FL (ref 80–94)
MONOCYTES # BLD AUTO: 0.69 X10(3)/MCL (ref 0.1–1.3)
MONOCYTES NFR BLD AUTO: 11 %
NEUTROPHILS # BLD AUTO: 3.96 X10(3)/MCL (ref 2.1–9.2)
NEUTROPHILS NFR BLD AUTO: 63.1 %
NRBC BLD AUTO-RTO: 0 %
OHS QRS DURATION: 88 MS
OHS QTC CALCULATION: 448 MS
PLATELET # BLD AUTO: 306 X10(3)/MCL (ref 130–400)
PMV BLD AUTO: 10.7 FL (ref 7.4–10.4)
POTASSIUM SERPL-SCNC: 3.7 MMOL/L (ref 3.5–5.1)
PROT SERPL-MCNC: 6.8 GM/DL (ref 5.8–7.6)
RBC # BLD AUTO: 4.35 X10(6)/MCL (ref 4.7–6.1)
SODIUM SERPL-SCNC: 139 MMOL/L (ref 136–145)
TRIGL SERPL-MCNC: 55 MG/DL (ref 34–140)
TSH SERPL-ACNC: 2.62 UIU/ML (ref 0.35–4.94)
VLDLC SERPL CALC-MCNC: 11 MG/DL
WBC # BLD AUTO: 6.27 X10(3)/MCL (ref 4.5–11.5)

## 2025-03-29 PROCEDURE — 97162 PT EVAL MOD COMPLEX 30 MIN: CPT

## 2025-03-29 PROCEDURE — 36415 COLL VENOUS BLD VENIPUNCTURE: CPT

## 2025-03-29 PROCEDURE — G0378 HOSPITAL OBSERVATION PER HR: HCPCS

## 2025-03-29 PROCEDURE — 84443 ASSAY THYROID STIM HORMONE: CPT

## 2025-03-29 PROCEDURE — S4991 NICOTINE PATCH NONLEGEND: HCPCS

## 2025-03-29 PROCEDURE — 92610 EVALUATE SWALLOWING FUNCTION: CPT

## 2025-03-29 PROCEDURE — 96372 THER/PROPH/DIAG INJ SC/IM: CPT | Performed by: HOSPITALIST

## 2025-03-29 PROCEDURE — 97166 OT EVAL MOD COMPLEX 45 MIN: CPT

## 2025-03-29 PROCEDURE — 85025 COMPLETE CBC W/AUTO DIFF WBC: CPT

## 2025-03-29 PROCEDURE — 80061 LIPID PANEL: CPT

## 2025-03-29 PROCEDURE — 63600175 PHARM REV CODE 636 W HCPCS: Performed by: HOSPITALIST

## 2025-03-29 PROCEDURE — 25000003 PHARM REV CODE 250: Performed by: HOSPITALIST

## 2025-03-29 PROCEDURE — 99233 SBSQ HOSP IP/OBS HIGH 50: CPT | Mod: FS,,, | Performed by: PSYCHIATRY & NEUROLOGY

## 2025-03-29 PROCEDURE — 25000003 PHARM REV CODE 250

## 2025-03-29 PROCEDURE — 25000003 PHARM REV CODE 250: Performed by: STUDENT IN AN ORGANIZED HEALTH CARE EDUCATION/TRAINING PROGRAM

## 2025-03-29 PROCEDURE — 80053 COMPREHEN METABOLIC PANEL: CPT

## 2025-03-29 RX ORDER — NICOTINE 7MG/24HR
1 PATCH, TRANSDERMAL 24 HOURS TRANSDERMAL DAILY
Status: DISCONTINUED | OUTPATIENT
Start: 2025-03-29 | End: 2025-03-31 | Stop reason: HOSPADM

## 2025-03-29 RX ORDER — ACETAMINOPHEN 325 MG/1
650 TABLET ORAL EVERY 6 HOURS PRN
Status: DISCONTINUED | OUTPATIENT
Start: 2025-03-29 | End: 2025-03-31 | Stop reason: HOSPADM

## 2025-03-29 RX ORDER — ALPRAZOLAM 0.25 MG/1
0.25 TABLET ORAL NIGHTLY PRN
Status: DISCONTINUED | OUTPATIENT
Start: 2025-03-29 | End: 2025-03-31 | Stop reason: HOSPADM

## 2025-03-29 RX ADMIN — FOLIC ACID 1 MG: 1 TABLET ORAL at 09:03

## 2025-03-29 RX ADMIN — NICOTINE 1 PATCH: 7 PATCH, EXTENDED RELEASE TRANSDERMAL at 11:03

## 2025-03-29 RX ADMIN — THIAMINE HCL TAB 100 MG 100 MG: 100 TAB at 09:03

## 2025-03-29 RX ADMIN — ALPRAZOLAM 0.25 MG: 0.25 TABLET ORAL at 09:03

## 2025-03-29 RX ADMIN — ENOXAPARIN SODIUM 40 MG: 40 INJECTION SUBCUTANEOUS at 05:03

## 2025-03-29 RX ADMIN — AMLODIPINE BESYLATE 5 MG: 5 TABLET ORAL at 09:03

## 2025-03-29 RX ADMIN — ACETAMINOPHEN 650 MG: 325 TABLET, FILM COATED ORAL at 06:03

## 2025-03-29 RX ADMIN — ASPIRIN 81 MG CHEWABLE TABLET 81 MG: 81 TABLET CHEWABLE at 09:03

## 2025-03-29 NOTE — SUBJECTIVE & OBJECTIVE
Subjective:     Interval History: No acute events overnight. MRI brain reveals right basal ganglia acute infarct extending  into the corona radiata; chronic microangiopathic ischemia and atrophy; pontine old lacunar infarct; predominantly infratentorial and few supratentorial dephasing artifacts on the gradient echo sequence, possibly related to amyloid angiopathy; and left anterior para falcine altered signal likely represents a small meningioma. MD reviewed MRI results with patient and family present in room in detail. Recommending discharge home on aspirin 81mg and therapy per PT recommendations.     Current Neurological Medications:      Current Facility-Administered Medications   Medication Dose Route Frequency Provider Last Rate Last Admin    amLODIPine tablet 5 mg  5 mg Oral Daily Александр Schofield MD   5 mg at 03/29/25 0908    aspirin chewable tablet 81 mg  81 mg Oral Daily Александр Schofield MD   81 mg at 03/29/25 0908    bisacodyL suppository 10 mg  10 mg Rectal Daily PRN Michelle Egan, FNP        dextrose 50% injection 12.5 g  12.5 g Intravenous PRN Александр Schofield MD        dextrose 50% injection 25 g  25 g Intravenous PRN Александр Schofield MD        enoxaparin injection 40 mg  40 mg Subcutaneous Daily Александр Schofield MD   40 mg at 03/28/25 1920    folic acid tablet 1 mg  1 mg Oral Daily Александр Schofield MD   1 mg at 03/29/25 0908    glucagon (human recombinant) injection 1 mg  1 mg Intramuscular PRN Александр Schofield MD        glucose chewable tablet 16 g  16 g Oral PRN Александр Schofield MD        glucose chewable tablet 24 g  24 g Oral PRN Александр Schofield MD        hydrALAZINE injection 10 mg  10 mg Intravenous Q4H PRN Александр Schofield MD        labetaloL injection 10 mg  10 mg Intravenous Q4H PRN Александр Schofield MD        naloxone 0.4 mg/mL injection 0.02 mg  0.02 mg Intravenous PRАлександр Garza MD        sodium chloride 0.9% flush 10 mL  10 mL Intravenous Q12H PRN Александр Schofield MD        thiamine tablet 100 mg  100  mg Oral Daily Александр Schofield MD   100 mg at 03/29/25 0908       Review of Systems   Eyes:  Negative for photophobia and visual disturbance.   Gastrointestinal:  Negative for nausea and vomiting.   Musculoskeletal:  Negative for neck pain and neck stiffness.   Neurological:  Positive for facial asymmetry (left), weakness (LLE) and headaches (intermittent; left). Negative for dizziness, tremors, seizures, syncope, speech difficulty, light-headedness and numbness.   Psychiatric/Behavioral:  Negative for agitation, behavioral problems and confusion.      Objective:     Vital Signs (Most Recent):  Temp: 97.6 °F (36.4 °C) (03/29/25 0750)  Pulse: 85 (03/29/25 0750)  Resp: 17 (03/29/25 0400)  BP: (!) 140/78 (03/29/25 0750)  SpO2: (!) 90 % (03/29/25 0750) Vital Signs (24h Range):  Temp:  [97.6 °F (36.4 °C)-98.4 °F (36.9 °C)] 97.6 °F (36.4 °C)  Pulse:  [44-89] 85  Resp:  [12-19] 17  SpO2:  [90 %-98 %] 90 %  BP: (138-176)/(78-91) 140/78     Weight: 67.9 kg (149 lb 12.8 oz)  Body mass index is 20.89 kg/m².     Physical Exam  Vitals and nursing note reviewed.   Constitutional:       General: He is not in acute distress.     Appearance: Normal appearance. He is normal weight. He is not ill-appearing, toxic-appearing or diaphoretic.   HENT:      Head: Normocephalic and atraumatic.      Nose: Nose normal.      Mouth/Throat:      Mouth: Mucous membranes are moist.   Eyes:      General: No visual field deficit.     Extraocular Movements: Extraocular movements intact.      Pupils: Pupils are equal, round, and reactive to light.   Pulmonary:      Effort: Pulmonary effort is normal. No respiratory distress.   Musculoskeletal:         General: Normal range of motion.      Cervical back: Normal range of motion. No rigidity.      Right lower leg: No edema.      Left lower leg: No edema.   Skin:     General: Skin is warm and dry.      Capillary Refill: Capillary refill takes less than 2 seconds.      Findings: No lesion.   Neurological:       General: No focal deficit present.      Mental Status: He is alert and oriented to person, place, and time.      GCS: GCS eye subscore is 4. GCS verbal subscore is 5. GCS motor subscore is 6.      Cranial Nerves: Facial asymmetry (mild left facial drooping) present. No cranial nerve deficit or dysarthria.      Sensory: No sensory deficit.      Motor: Weakness (mild left sided weakness) present. No tremor, atrophy, abnormal muscle tone, seizure activity or pronator drift.      Coordination: Coordination normal.   Psychiatric:         Mood and Affect: Mood normal.         Behavior: Behavior normal.          NEUROLOGICAL EXAMINATION:     MENTAL STATUS   Oriented to person, place, and time.     CRANIAL NERVES     CN III, IV, VI   Pupils are equal, round, and reactive to light.      Significant Labs: CBC:   Recent Labs   Lab 03/28/25 1646 03/29/25  0311   WBC 6.28 6.27   HGB 14.2 13.4*   HCT 43.6 39.6*    306     CMP:   Recent Labs   Lab 03/28/25 1646 03/29/25  0311   NA  --  139   K  --  3.7   CL  --  106   CO2  --  27   BUN  --  15.2   CREATININE 0.67* 0.64*   CALCIUM  --  9.1   ALBUMIN  --  3.3*   BILITOT  --  0.5   ALKPHOS  --  63   AST  --  12   ALT  --  9     All pertinent lab results from the past 24 hours have been reviewed.    Significant Imaging: I have reviewed all pertinent imaging results/findings within the past 24 hours.

## 2025-03-29 NOTE — HOSPITAL COURSE
3/28: patient transferred to [facility] 2/2 complaints of left leg weakness that began around 2200 on 3/27/2025. He reports having frequent left-sided headaches and falls. Imaging at outlying facility revealed occluded right P1 segment, suspected partially calcified anterior falcine meningioma. Patient was transferred to [facility] for vascular neurology consult.

## 2025-03-29 NOTE — PLAN OF CARE
Problem: Physical Therapy  Goal: Physical Therapy Goal  Description: Goals to be met by: 2025     Patient will increase functional independence with mobility by performin. Supine to sit with Stand-by Assistance  2. Sit to supine with Stand-by Assistance  3. Sit to stand transfer with Stand-by Assistance  4. Bed to chair transfer with Stand-by Assistance using Rolling Walker  5. Gait  x 150 feet with Stand-by Assistance using Rolling Walker.     Outcome: Progressing

## 2025-03-29 NOTE — PT/OT/SLP EVAL
[facility]  Speech Language Pathology Department  Clinical Swallow Evaluation    Patient Name:  Manjit Reynolds   MRN:  [id number]    Recommendations     General recommendations:  Speech/Language and Cognitive Evaluation  Solid texture recommendation:  Regular Diet - IDDSI Level 7  Liquid consistency recommendation: Thin liquids - IDDSI Level 0   Medications: per patient preference  Swallow strategies/precautions: small bites/sips, slow rate, and upright for PO intake    History     Manjit Reynolds is a 89 y.o. male with past medical history significant for traumatic SDH, HTN, migraines and prostate cancer who presented to an [facility] ED on 3/28/2025 with complaints of left leg weakness that began around 2200 on 3/27/2025. Patient was transferred to [facility] for vascular neurology consult. MRI Brain revealed acute infarct of right basal ganglia extending to the corona radiata.    No past medical history on file.  No past surgical history on file.    Home diet texture/consistency: Regular and thin liquids  Current method of nutrition: PO diet (same as above)    Imaging   No results found for this or any previous visit.    No results found for this or any previous visit.    Results for orders placed during the hospital encounter of 03/28/25    MRI Brain Without Contrast    Narrative  EXAMINATION:  MRI BRAIN WITHOUT CONTRAST    CLINICAL HISTORY:  Headache on left side for past couple of weeks.  Possible stroke    TECHNIQUE:  Multiplanar MRI sequences were performed of the brain without contrast.    COMPARISON:  Outside facility CT brain March 28, 2025.    FINDINGS:  The right basal ganglia extend to the corona radiata is remarkable for diffusion restriction and signal dropout on ADC map and T2 FLAIR hyperintensity and is consistent with acute nonhemorrhagic infarct.  Chronic microvascular ischemic changes are moderate with periventricular and deep white matter T2 FLAIR hyperintense signals.  There  are pontine old lacunar infarcts and chronic microangiopathic ischemia.  Generalized cerebral cortical volume loss.  Bilateral posterior fossa cerebellar numerous gradient echo sequence dephasing artifacts with corresponding diffusion weighted hypointense signals may be related to amyloid angiopathy.  Similarly there are few gradient echo sequence supratentorial dephasing artifacts.  Nonspecific diffuse dural thickening.  There is left anterior parafalcine 1.2 cm altered signal which is suspected to represent a meningioma on image 14 series 4.  There are no surrounding brain edematous changes. The sella and suprasellar areas are unremarkable.    The cerebellar tonsils are normally positioned. There is no acute intracranial hemorrhage, hydrocephalus, midline shift or mass effect. No acute extra axial fluid collections identified. The mastoid air cells are clear.  There is loss of pneumatization left mastoid air cells with pronounced mucoperiosteal thickening.  There is also lesser mucosal thickening of the right maxillary sinus and the ethmoidal air cells    Impression  1.  Right basal ganglia extend to the corona radiata acute infarct.    2.  Chronic microangiopathic ischemia and atrophy.  Pontine old lacunar infarct.    3.  Predominantly infratentorial and few supratentorial dephasing artifacts on the gradient echo sequence could be related to amyloid angiopathy.    4.  Left anterior para falcine altered signal likely represents a small meningioma.    5.  Nonspecific diffuse dural thickening which can be seen with variety of causes including inflammatory infectious causes.      Electronically signed by: Chi Castillo  Date:    03/28/2025  Time:    19:02    Subjective     Patient awake, alert, and cooperative.    Spiritual/Cultural/Jain Beliefs/Practices that affect care: no    Pain/Comfort: Pain Rating 1: 0/10    Objective     ORAL MUSCULATURE  Dentition: own teeth  Secretion Management: adequate  Mucosal  Quality: good  Facial Movement: WFL  Buccal Strength & Mobility: WFL  Mandibular Strength & Mobility: WFL  Oral Labial Strength & Mobility: WFL  Lingual Strength & Mobility: WFL  Vocal Quality: adequate    PO TRIALS  Consistency Fed By Oral Symptoms Pharyngeal Symptoms   Thin liquid by straw Self None None   Puree Self None None   Regular solid Self Prolonged bolus formation/mastication None     Assessment     No signs/sx of aspiration observed. Continue regular diet at this time. ST to follow up for cognitive-linguistic evaluation as appropriate.    Outcome Measures     Functional Oral Intake Scale: 7 - Total oral diet with no restrictions    Goals     Multidisciplinary Problems       SLP Goals       Not on file                  Education     Patient and family were provided with verbal education regarding ST POC.  Understanding was verbalized, however additional teaching warranted.    Plan     SLP Follow-Up:  Yes   Plan of Care reviewed with:  patient, family     Time Tracking     SLP Treatment Date:   03/29/25  Speech Start Time:  1030  Speech Stop Time:  1040     Speech Total Time (min):  10 min    Billable minutes:  Swallow and Oral Function Evaluation, 10 minutes     03/29/2025

## 2025-03-29 NOTE — PROGRESS NOTES
[facility]  Hospital Medicine Progress Note        Chief Complaint: Inpatient Follow-up for     HPI:   89-year-old male who was transferred from an outside facility secondary to left-sided weakness and left facial droop.  Weakness began suddenly night prior to arrival.  He also reported headache.  Patient did have some mild weakness prior secondary to a previous stroke.  Imaging performed at outside facility showed a included rate P1 segment.  He was transferred to [facility] neurology consultation.  Headache has subsided.  No further facial droop.  Neurology has already evaluated.  Recommending aspirin load and then maybe aspirin daily.  Checking MRI but no planned intervention at this time.  Okay to normalize his blood pressure.     Vital signs on arrival showed a elevated blood pressure home 74/91.  He was mildly tachypneic but on room air.  No labs have been drawn but reported as normal at outside facility.  He is being admitted to the hospitalist group. MRI Brain showed R BG acute CVA. Echo pending. PT/OT consulted. Neurology on-board.    Interval Hx:   Denied any new complaints. Will follow PT/OT recommendations for DC planning.    Case was discussed with patient's nurse and  on the floor.    Objective/physical exam:  General: In no acute distress, afebrile  Chest: Clear to auscultation bilaterally  Heart: RRR, +S1, S2, no appreciable murmur  Abdomen: Soft, nontender, BS +  MSK: Warm, no lower extremity edema, no clubbing or cyanosis  Neurologic: Alert and oriented x4, Cranial nerve II-XII intact, Strength 5/5 in all 4 extremities    VITAL SIGNS: 24 HRS MIN & MAX LAST   Temp  Min: 97.6 °F (36.4 °C)  Max: 98.4 °F (36.9 °C) 97.6 °F (36.4 °C)   BP  Min: 138/86  Max: 176/85 (!) 140/78   Pulse  Min: 44  Max: 89  85   Resp  Min: 12  Max: 19 17   SpO2  Min: 90 %  Max: 98 % (!) 90 %     I have reviewed the following labs:  Recent Labs   Lab 03/28/25  1646 03/29/25  0311   WBC 6.28  6.27   RBC 4.71 4.35*   HGB 14.2 13.4*   HCT 43.6 39.6*   MCV 92.6 91.0   MCH 30.1 30.8   MCHC 32.6* 33.8   RDW 13.7 13.7    306   MPV 10.5* 10.7*     Recent Labs   Lab 03/28/25  1646 03/29/25  0311   NA  --  139   K  --  3.7   CL  --  106   CO2  --  27   BUN  --  15.2   CREATININE 0.67* 0.64*   CALCIUM  --  9.1   ALBUMIN  --  3.3*   ALKPHOS  --  63   ALT  --  9   AST  --  12   BILITOT  --  0.5     Assessment/Plan:  Partially calcified anterior falcine meningioma  LLE weakness  Tobacco dependence  H/O: HTN, prostate cancer, SDH    No new complaints  Neurology on-board  On ASA 81 mg daily  MRI brain reviewed; Echo pending  PT/OT consulted; follow recommendations  Speech therapy cleared patient for PO intake  On Amlodipine, Folic Acid, Thiamine  Continue monitoring symptoms    VTE prophylaxis: Lovenox    Patient condition:  Stable    Anticipated discharge and Disposition:     Pending    All diagnosis and differential diagnosis have been reviewed; assessment and plan has been documented; I have personally reviewed the labs and test results that are presently available; I have reviewed the patients medication list; I have reviewed the consulting providers response and recommendations. I have reviewed or attempted to review medical records based upon their availability    All of the patient's questions have been  addressed and answered. Patient's is agreeable to the above stated plan. I will continue to monitor closely and make adjustments to medical management as needed.    Portions of this note dictated using EMR integrated voice recognition software, and may be subject to voice recognition errors not corrected at proofreading. Please contact writer for clarification if needed.     Radiology:  I have personally reviewed the following imaging and agree with the radiologist.     MRI Cervical Spine Without Contrast  Narrative: EXAMINATION:  MRI CERVICAL SPINE WITHOUT CONTRAST    CLINICAL  HISTORY:  stroke;    TECHNIQUE:  Multiple MRI pulse sequences were performed of the cervical spine without contrast.    COMPARISON:  CT March 28, 2025    FINDINGS:  Cervical vertebrae stature is preserved and the alignment is unremarkable.  No acute marrow edematous signal.  No prevertebral soft tissue prominence.  There are several indentations upon the thecal sac by disc bulge and spondylosis without cord compression.  There is no cord edema or myelomalacia.  Disc segmental analysis is given below:    At C2-C3, disc is unremarkable.  There is uncovertebral and facet arthropathy without significant narrowings of the neural foramen.    At C3-C4, there is osteophyte disc complex and facet arthropathy which causes global effacement of the subarachnoid space without cord compression.  There is mild narrowing of the right neural foramen.  Marked narrowing of the left neural foramen is caused by uncovertebral and facet arthropathy.    At C4-C5, there is osteophyte disc complex and facet arthropathy causing global effacement of the subarachnoid space.  Cord is not compressed.  There is marked narrowing of right neural foramen and severe narrowing of the left neural foramen caused by uncovertebral and facet arthropathy.    At C5-C6, there is broad ventral osteophyte ridging which indents the ventral thecal sac.  Cord is not compressed.  There is marked narrowing of the right neural foramen and severe spondylotic narrowing of the left neural foramen.    At C6-C7, there is ventral osteophyte ridging without causing cord compression.  There are bilateral marked spondylotic narrowings of the neural foramen.    At C7-T1, there is no disc herniation.  Central canal is not stenosed.  There are mild narrowings of the neural foramen.  Impression: Cervical spine degenerative disc disease and spondylosis level by level discussed above.    Electronically signed by: Chi Castillo  Date:    03/28/2025  Time:    19:09  MRI Brain Without  Contrast  Narrative: EXAMINATION:  MRI BRAIN WITHOUT CONTRAST    CLINICAL HISTORY:  Headache on left side for past couple of weeks.  Possible stroke    TECHNIQUE:  Multiplanar MRI sequences were performed of the brain without contrast.    COMPARISON:  Outside facility CT brain March 28, 2025.    FINDINGS:  The right basal ganglia extend to the corona radiata is remarkable for diffusion restriction and signal dropout on ADC map and T2 FLAIR hyperintensity and is consistent with acute nonhemorrhagic infarct.  Chronic microvascular ischemic changes are moderate with periventricular and deep white matter T2 FLAIR hyperintense signals.  There are pontine old lacunar infarcts and chronic microangiopathic ischemia.  Generalized cerebral cortical volume loss.  Bilateral posterior fossa cerebellar numerous gradient echo sequence dephasing artifacts with corresponding diffusion weighted hypointense signals may be related to amyloid angiopathy.  Similarly there are few gradient echo sequence supratentorial dephasing artifacts.  Nonspecific diffuse dural thickening.  There is left anterior parafalcine 1.2 cm altered signal which is suspected to represent a meningioma on image 14 series 4.  There are no surrounding brain edematous changes. The sella and suprasellar areas are unremarkable.    The cerebellar tonsils are normally positioned. There is no acute intracranial hemorrhage, hydrocephalus, midline shift or mass effect. No acute extra axial fluid collections identified. The mastoid air cells are clear.  There is loss of pneumatization left mastoid air cells with pronounced mucoperiosteal thickening.  There is also lesser mucosal thickening of the right maxillary sinus and the ethmoidal air cells  Impression: 1.  Right basal ganglia extend to the corona radiata acute infarct.    2.  Chronic microangiopathic ischemia and atrophy.  Pontine old lacunar infarct.    3.  Predominantly infratentorial and few supratentorial  dephasing artifacts on the gradient echo sequence could be related to amyloid angiopathy.    4.  Left anterior para falcine altered signal likely represents a small meningioma.    5.  Nonspecific diffuse dural thickening which can be seen with variety of causes including inflammatory infectious causes.    Electronically signed by: Chi Castillo  Date:    03/28/2025  Time:    19:02      John Sanchez MD  Department of Hospital Medicine   [facility]   03/29/2025

## 2025-03-29 NOTE — PLAN OF CARE
Problem: Occupational Therapy  Goal: Occupational Therapy Goal  Description: LTG: Pt will perform basic ADLs and ADL transfers with Modified independence using LRAD by discharge.    STG: to be met by 4/12    Pt will complete grooming standing at sink with LRAD with SBA.  Pt will complete UB dressing with SBA.  Pt will complete LB dressing with SBA using LRAD.  Pt will complete toileting with SBA using LRAD.  Pt will complete functional mobility to/from toilet and toilet transfer with SBA using LRAD.    Outcome: Progressing       Patient with fair tolerance for today's session. Patient presents mild L UE incoordination, decreased standing balance/tolerance, gait instability, decreased endurance/ activity intolerance, limited safety awareness, unfamiliarity with adaptive techniques for BADLs, and high risk for falls impeding independence in meaningful activities of daily living. Patient to benefit from continued skilled OT intervention to address aforementioned deficits to reduce risk of falls and improve occupational performance prior to next level of care.

## 2025-03-29 NOTE — PT/OT/SLP EVAL
Occupational Therapy  Evaluation    Name: Manjit Reynolds  MRN: [id number]  Admitting Diagnosis:   1. Cerebrovascular accident (CVA), unspecified mechanism    2. Benign essential HTN    3. Chest pain    4. CVA (cerebral vascular accident)    5. Stroke      Recent Surgery: * No surgery found *      Recommendations:     Discharge therapy intensity:  High Intensity    Discharge Equipment Recommendations:  to be determined by next level of care  Barriers to discharge:  Inaccessible home environment    Assessment:     Manjit Reynolds is a 89 y.o. male with a medical diagnosis of cerebrovascular accident, MRI reveals right basal ganglia acute infarct extending into corona radiata; chronic microangiopathic ischemia and atrophy; pontine old lacunar infarct; predominantly infratentorial and few supratentorial dephasing artifacts on the gradient echo sequence, possibly related to amyloid angiopathy; and left anterior para falcine altered signal likely represents a small meningioma.  He presents with the following performance deficits affecting function: weakness, impaired endurance, impaired self care skills, impaired functional mobility, gait instability, impaired balance, decreased coordination, decreased upper extremity function.     Patient with fair tolerance for today's session. Patient presents mild L UE incoordination, decreased standing balance/tolerance, gait instability, decreased endurance/ activity intolerance, limited safety awareness, unfamiliarity with adaptive techniques for BADLs, and high risk for falls impeding independence in meaningful activities of daily living. Patient to benefit from continued skilled OT intervention to address aforementioned deficits to reduce risk of falls and improve occupational performance prior to next level of care.     Rehab Prognosis: Good and Fair; patient would benefit from acute skilled OT services to address these deficits and reach maximum level of function.       Plan:      Patient to be seen 6x/week to address the above listed problems via self-care/home management, therapeutic activities, therapeutic exercises, neuromuscular re-education, cognitive retraining  Plan of Care Expires: 04/29/25  Plan of Care Reviewed with: patient, spouse, daughter    Subjective     Chief Complaint: none   Patient/Family Comments/goals: family requesting placement following current hospitalization for increased therapy to improve strength    Occupational Profile:  Living Environment: Patient lives in single level home with 3 steps to enter, family lives close by   Previous level of function: patient requires assist for majority BADLs and all IADLs at baseline from spouse, reports supervision level of assist for functional mobility within home   Equipment Used at Home: rollator, shower chair  Assistance upon Discharge: spouse and family     Pain/Comfort:  Pain Rating 1: 0/10    Patients cultural, spiritual, L[employer]-day conflicts given the current situation: no    Objective:     OT communicated with RN prior to session.      Patient was found supine with bed alarm, blood pressure cuff, PureWick, peripheral IV, pulse ox (continuous), SCD, telemetry upon OT entry to room.    General Precautions: Standard, fall, hearing impaired  Orthopedic Precautions: N/A  Braces: N/A    Vital Signs: Blood Pressure: 146/70, HR 69 bpm    Bed Mobility:    Patient completed Supine to Sit with minimum assistance    Functional Mobility/Transfers:  Patient completed Sit <> Stand Transfer with minimum assistance  with  rolling walker   Patient completed Toilet Transfer Step Transfer technique with minimum assistance with  rolling walker  Functional Mobility: Patient complete functional mobility within hospital room with RW req mod A throughout for safety/balance with increased difficulty noted completing turning (L > R). See PT report.     Activities of Daily Living:  Grooming: contact guard assistance Patient complete hand  hygiene at sink req CGA for safety/balance and max vc's for positioning of self/RW  Upper Body Dressing: maximal assistance Don hospital gown seated EOB   Lower Body Dressing: moderate assistance Don/doff L sock seated EOB with notably limited L UE fine motor coordination and core instability limited dynamic sitting balance     AMPA 6 Click ADL:  [facility] Total Score:      Functional Cognition:  Affect: Cooperative and Flat   Attention: Easily distracted  Safety Awareness: Impaired. Patient with limited attention to task and safety awareness noted throughout session requiring max vc's for attention to hospital room hazards, RW positioning, and safety     Visual Perceptual Skills:  Intact    Upper Extremity Function:  Right Upper Extremity:   Range of Motion: WFL  Strength: WFL  Coordination: WFL    Left Upper Extremity:  Range of Motion: WFL  Strength: WFL  Coordination: Impaired. Limited finger/nose approximation and decreased speed     Balance:   Static sitting balance: WFL  Static standing balance:Impaired. Patient with limited core stability, trunk kyphosis with noted anterior weightshifting, wide base of support, and decreased functional use of RW limiting standing balance and safety     Therapeutic Positioning  Risk for acquired pressure injuries is increased due to relative decrease in mobility d/t hospitalization .    OT interventions performed during the course of today's session:   Positioning recommendations were communicated to care team     Skin assessment: all bony prominences were assessed    Findings: no redness or breakdown noted    OT recommendations for therapeutic positioning throughout hospitalization:   Follow [facility] Pressure Injury Prevention Protocol      Patient Education:  Patient, spouse were, and daughter/s were provided with verbal education education regarding OT role/goals/POC, fall prevention, safety awareness, Discharge/DME recommendations, and home exercise program .  Understanding was  verbalized, however additional teaching warranted.     Patient left up in chair with all lines intact, call button in reach, chair alarm on, and RN notified.    GOALS:   Multidisciplinary Problems       Occupational Therapy Goals          Problem: Occupational Therapy    Goal Priority Disciplines Outcome Interventions   Occupational Therapy Goal     OT, PT/OT Progressing    Description: LTG: Pt will perform basic ADLs and ADL transfers with Modified independence using LRAD by discharge.    STG: to be met by 4/12    Pt will complete grooming standing at sink with LRAD with SBA.  Pt will complete UB dressing with SBA.  Pt will complete LB dressing with SBA using LRAD.  Pt will complete toileting with SBA using LRAD.  Pt will complete functional mobility to/from toilet and toilet transfer with SBA using LRAD.                         History:     No past medical history on file.    No past surgical history on file.    Time Tracking:     OT Date of Treatment: 03/29/25  OT Start Time: 0956  OT Stop Time: 1015  OT Total Time (min): 19 min    Billable Minutes:Evaluation 19    3/29/2025     Bed/Stretcher in lowest position, wheels locked, appropriate side rails in place/Call bell, personal items and telephone in reach/Instruct patient to call for assistance before getting out of bed/chair/stretcher/Non-slip footwear applied when patient is off stretcher/[place] to call system/Physically safe environment - no spills, clutter or unnecessary equipment/Purposeful proactive rounding/Room/bathroom lighting operational, light cord in reach

## 2025-03-29 NOTE — PT/OT/SLP EVAL
Physical Therapy Evaluation    Patient Name:  Manjit Reynolds   MRN:  [id number]    Recommendations:     Discharge therapy intensity: High Intensity Therapy   Discharge Equipment Recommendations: to be determined by next level of care   Barriers to discharge: Impaired mobility and Ongoing medical needs    Assessment:     Manjit Reynolds is a 89 y.o. 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 He presents with the following impairments/functional limitations: gait instability, impaired balance, decreased coordination, impaired self care skills.    Pt presenting with ataxia of LLE with ambulation. Pt with no significant LE weaknesses. Pt performed bed mobility, transfers, and ambulation with Min A. Pt amb 30ft Min A using RW demo mild unsteadiness with straight line ambulation and moderate unsteadiness with turning. Prior to this admission pt was ambulating with a rollator req SBA for short distances. Pt lives with his wife who assist patient with ADLs. Family lives nearby to also assist pt. Recommending high intensity therapy upon discharge in order to maximize independence prior to going home to decrease wife's burden.     Of note: patient is hard of hearing.     Rehab Prognosis: Good; patient would benefit from acute skilled PT services to address these deficits and reach maximum level of function.    Recent Surgery: * No surgery found *      Plan:     During this hospitalization, patient would benefit from acute PT services 6 x/week to address the identified rehab impairments via gait training, therapeutic activities, neuromuscular  re-education and progress toward the following goals:    Plan of Care Expires:  04/29/25    Subjective     Chief Complaint: none stated   Patient/Family Comments/goals: family requesting for patient to go somewhere for more therapy  Pain/Comfort:  Pain Rating 1: 0/10    Patients cultural, spiritual, Amish conflicts given the current situation: no    Living Environment:  Pt lives with his wife in a SLH with 3 BALDOMERO. Family lives nearby.   Prior to admission, patients level of function was (SBA using rollator for household distances).  Equipment used at home: rollator.  DME owned (not currently used): none.  Upon discharge, patient will have assistance from spouse and family.    Objective:     Communicated with nsg prior to session.  Patient found HOB elevated with peripheral IV, pulse ox (continuous), telemetry  upon PT entry to room.    General Precautions: Standard, fall, hearing impaired  Orthopedic Precautions:N/A   Braces: N/A  Respiratory Status: Room air  Blood Pressure: 146/70  HR: 69      Exams:  RLE ROM: WFL  RLE Strength: WFL  LLE ROM: WFL  LLE Strength: WFL  Skin integrity: Visible skin intact  ataxia noted to LLE with ambulation.       Functional Mobility:  Bed Mobility:     Supine to Sit: minimum assistance  Transfers:     Sit to Stand:  minimum assistance with rolling walker  Gait: Pt amb 30ft total Min A using RW. Pt demo BLE unsteadiness, step to pattern, and mild LLE external rotation. Increased difficulty with turning/changing direction especially towards patients L side. Better stability when turning towards his R. Pt req VC for walker management and navigation.        AM-PAC 6 CLICK MOBILITY  Total Score:18       Treatment & Education:      Patient and family were provided with verbal education education regarding PT role/goals/POC, fall prevention, safety awareness, and discharge/DME recommendations.  Understanding was verbalized, however additional teaching warranted.     Patient left up in  chair with all lines intact, call button in reach, chair alarm on, and nsg notified and family present.    GOALS:   Multidisciplinary Problems       Physical Therapy Goals          Problem: Physical Therapy    Goal Priority Disciplines Outcome Interventions   Physical Therapy Goal     PT, PT/OT Progressing    Description: Goals to be met by: 2025     Patient will increase functional independence with mobility by performin. Supine to sit with Stand-by Assistance  2. Sit to supine with Stand-by Assistance  3. Sit to stand transfer with Stand-by Assistance  4. Bed to chair transfer with Stand-by Assistance using Rolling Walker  5. Gait  x 150 feet with Stand-by Assistance using Rolling Walker.                          History:     No past medical history on file.    No past surgical history on file.    Time Tracking:     PT Received On: 25  PT Start Time: 0956     PT Stop Time: 1015  PT Total Time (min): 19 min     Billable Minutes: Evaluation Mod      2025

## 2025-03-29 NOTE — PROGRESS NOTES
[facility] - Neurology  Neurology  Progress Note    Patient Name: Manjit Reynolds  MRN: [id number]  Admission Date: 3/28/2025  Hospital Length of Stay: 0 days  Code Status: Full Code   Attending Provider: Michael Goel MD  Primary Care Physician: No primary care provider on file.   Principal Problem:<principal problem not specified>      Overview/Hospital Course:  3/28: patient transferred to [facility] 2/2 complaints of left leg weakness that began around 2200 on 3/27/2025. He reports having frequent left-sided headaches and falls. Imaging at outlying facility revealed occluded right P1 segment, suspected partially calcified anterior falcine meningioma. Patient was transferred to [facility] for vascular neurology consult.     Subjective:     Interval History (3/29): No acute events overnight. MRI brain reveals right basal ganglia acute infarct extending  into the corona radiata; chronic microangiopathic ischemia and atrophy; pontine old lacunar infarct; predominantly infratentorial and few supratentorial dephasing artifacts on the gradient echo sequence, possibly related to amyloid angiopathy; and left anterior para falcine altered signal likely represents a small meningioma. MD reviewed MRI results with patient and family present in room in detail. Recommending discharge home on aspirin 81mg and therapy per PT recommendations.     Current Neurological Medications:      Current Facility-Administered Medications   Medication Dose Route Frequency Provider Last Rate Last Admin    amLODIPine tablet 5 mg  5 mg Oral Daily Александр Schofield MD   5 mg at 03/29/25 0908    aspirin chewable tablet 81 mg  81 mg Oral Daily Александр Schofield MD   81 mg at 03/29/25 0908    bisacodyL suppository 10 mg  10 mg Rectal Daily PRN Michelle Egan FNP        dextrose 50% injection 12.5 g  12.5 g Intravenous PRN Александр Schofield MD        dextrose 50% injection 25 g  25 g Intravenous PRN Александр Schofield MD        enoxaparin injection 40 mg   40 mg Subcutaneous Daily Александр Schofield MD   40 mg at 03/28/25 1920    folic acid tablet 1 mg  1 mg Oral Daily Александр Schofield MD   1 mg at 03/29/25 0908    glucagon (human recombinant) injection 1 mg  1 mg Intramuscular PRN Александр Schofield MD        glucose chewable tablet 16 g  16 g Oral PRN Александр Schofield MD        glucose chewable tablet 24 g  24 g Oral PRN Александр Schofield MD        hydrALAZINE injection 10 mg  10 mg Intravenous Q4H PRN Александр Schofield MD        labetaloL injection 10 mg  10 mg Intravenous Q4H PRN Александр Schofield MD        naloxone 0.4 mg/mL injection 0.02 mg  0.02 mg Intravenous PRN Александр Schofield MD        sodium chloride 0.9% flush 10 mL  10 mL Intravenous Q12H PRN Александр Schofield MD        thiamine tablet 100 mg  100 mg Oral Daily Александр Schofield MD   100 mg at 03/29/25 0908       Review of Systems   Eyes:  Negative for photophobia and visual disturbance.   Gastrointestinal:  Negative for nausea and vomiting.   Musculoskeletal:  Negative for neck pain and neck stiffness.   Neurological:  Positive for facial asymmetry (left), weakness (LLE) and headaches (intermittent; left). Negative for dizziness, tremors, seizures, syncope, speech difficulty, light-headedness and numbness.   Psychiatric/Behavioral:  Negative for agitation, behavioral problems and confusion.      Objective:     Vital Signs (Most Recent):  Temp: 97.6 °F (36.4 °C) (03/29/25 0750)  Pulse: 85 (03/29/25 0750)  Resp: 17 (03/29/25 0400)  BP: (!) 140/78 (03/29/25 0750)  SpO2: (!) 90 % (03/29/25 0750) Vital Signs (24h Range):  Temp:  [97.6 °F (36.4 °C)-98.4 °F (36.9 °C)] 97.6 °F (36.4 °C)  Pulse:  [44-89] 85  Resp:  [12-19] 17  SpO2:  [90 %-98 %] 90 %  BP: (138-176)/(78-91) 140/78     Weight: 67.9 kg (149 lb 12.8 oz)  Body mass index is 20.89 kg/m².     Physical Exam  Vitals and nursing note reviewed.   Constitutional:       General: He is not in acute distress.     Appearance: Normal appearance. He is normal weight. He is not  ill-appearing, toxic-appearing or diaphoretic.   HENT:      Head: Normocephalic and atraumatic.      Nose: Nose normal.      Mouth/Throat:      Mouth: Mucous membranes are moist.   Eyes:      General: No visual field deficit.     Extraocular Movements: Extraocular movements intact.      Pupils: Pupils are equal, round, and reactive to light.   Pulmonary:      Effort: Pulmonary effort is normal. No respiratory distress.   Musculoskeletal:         General: Normal range of motion.      Cervical back: Normal range of motion. No rigidity.      Right lower leg: No edema.      Left lower leg: No edema.   Skin:     General: Skin is warm and dry.      Capillary Refill: Capillary refill takes less than 2 seconds.      Findings: No lesion.   Neurological:      General: No focal deficit present.      Mental Status: He is alert and oriented to person, place, and time.      GCS: GCS eye subscore is 4. GCS verbal subscore is 5. GCS motor subscore is 6.      Cranial Nerves: Facial asymmetry (mild left facial drooping) present. No cranial nerve deficit or dysarthria.      Sensory: No sensory deficit.      Motor: Weakness (mild left sided weakness) present. No tremor, atrophy, abnormal muscle tone, seizure activity or pronator drift.      Coordination: Coordination normal.   Psychiatric:         Mood and Affect: Mood normal.         Behavior: Behavior normal.          NEUROLOGICAL EXAMINATION:     MENTAL STATUS   Oriented to person, place, and time.     CRANIAL NERVES     CN III, IV, VI   Pupils are equal, round, and reactive to light.      Significant Labs: CBC:   Recent Labs   Lab 03/28/25  1646 03/29/25  0311   WBC 6.28 6.27   HGB 14.2 13.4*   HCT 43.6 39.6*    306     CMP:   Recent Labs   Lab 03/28/25  1646 03/29/25  0311   NA  --  139   K  --  3.7   CL  --  106   CO2  --  27   BUN  --  15.2   CREATININE 0.67* 0.64*   CALCIUM  --  9.1   ALBUMIN  --  3.3*   BILITOT  --  0.5   ALKPHOS  --  63   AST  --  12   ALT  --  9      All pertinent lab results from the past 24 hours have been reviewed.    Significant Imaging: I have reviewed all pertinent imaging results/findings within the past 24 hours.  Assessment and Plan:     Left-sided weakness  - presented with left leg weakness, left facial droop and sensory loss  - Stroke RF: HTN, prostate CA, tobacco dependence  - Intervention: None-- OOW for TNK, No LVO  - Etiology: small vessel ischemic stroke     Stroke workup:  [facility]: Occluded right P1 segment, suspected partially calcified anterior falcine meningioma   -MRI brain: right basal ganglia acute infarct extending  into the corona radiata; chronic microangiopathic ischemia and atrophy; pontine old lacunar infarct; predominantly infratentorial and few supratentorial dephasing artifacts on the gradient echo sequence, possibly related to amyloid angiopathy; and left anterior para falcine altered signal likely represents a small meningioma.       NIH Stroke Scale      1a  Level of consciousness: 0=alert; keenly responsive   1b. LOC questions:  0=Answers both tasks correctly   1c. LOC commands: 0=Answers both tasks correctly   2.  Best Gaze: 0=normal   3.  Visual: 0=No visual loss   4. Facial Palsy: 1=Minor paralysis (flattened nasolabial fold, asymmetric on smiling)   5a.  Motor left arm: 0=No drift, limb holds 90 (or 45) degrees for full 10 seconds   5b.  Motor right arm: 0=No drift, limb holds 90 (or 45) degrees for full 10 seconds   6a. motor left le=No drift, limb holds 90 (or 45) degrees for full 10 seconds   6b  Motor right le=No drift, limb holds 90 (or 45) degrees for full 10 seconds   7. Limb Ataxia: 0=Absent   8.  Sensory: 1=Mild to moderate sensory loss; patient feels pinprick is less sharp or is dull on the affected side; there is a loss of superficial pain with pinprick but patient is aware He is being touched   9. Best Language:  0=No aphasia, normal   10. Dysarthria:  0=Normal   11. Extinction and Inattention: 0=No abnormality   12. Distal motor function: 0=Normal    Total:   2         Plan:  -MRI brain confirms acute right basal ganglia stroke extending into corona radiata, as well as mild evidence of cerebral amyloid angiopathy  -recommend discharge home on aspirin 81mg daily (no DAPT 2/2 CAA)  -ok to normalize blood pressure  -PT/OT/ST eval and recs  -patient will need rehab.. defer to PT need for inpatient vs outpatient   -patient will need follow up in stroke clinic with Kelsey Schmitt NP in 3 months from discharge  -stroke education for patient and family provided  -No further recommendations from stroke neurology standpoint, will sign off.  Please call with any further questions/concerns.    Patient/Family Stroke Education:  -Written information on below given to patient:   -I have discussed the patient's stroke risk factors and the importance to modify them in order to reduce the risk of future events.  Also, the importance of a healthy diet and daily exercise in order to reduce the risk of future strokes.  -I went over the usual stroke warning signs and symptoms, and the need to activate EMS (call 911) as soon as the symptoms present.  Sudden onset numbness or weakness of the face, arm, or leg; especially on one side of the body  Sudden confusion, trouble speaking, or understanding  Sudden trouble seeing in one or both eyes  Sudden trouble walking, dizziness, loss of coordination  Sudden severe headache with no known cause  -Discussed prescribed medication regime with the patient, including the indication for the medications as well as their potential side effects, and what to do in case they appear.         VTE Risk Mitigation (From admission, onward)           Ordered     enoxaparin injection 40 mg  Daily         03/28/25 1632     IP VTE HIGH RISK PATIENT  Once         03/28/25 1632     Place sequential compression device  Until discontinued         03/28/25 1632                     Michelle Egan, P  Neurology  [facility] - Neurology

## 2025-03-29 NOTE — ASSESSMENT & PLAN NOTE
- presented with left leg weakness, left facial droop and sensory loss  - Stroke RF: HTN, prostate CA, tobacco dependence  - Intervention: None-- OOW for TNK, No LVO  - Etiology: small vessel ischemic stroke     Stroke workup:  -CTh and CTA h/n from [facility] facility on 3/28/2025: Occluded right P1 segment, suspected partially calcified anterior falcine meningioma   -MRI brain: right basal ganglia acute infarct extending  into the corona radiata; chronic microangiopathic ischemia and atrophy; pontine old lacunar infarct; predominantly infratentorial and few supratentorial dephasing artifacts on the gradient echo sequence, possibly related to amyloid angiopathy; and left anterior para falcine altered signal likely represents a small meningioma.       NIH Stroke Scale      1a  Level of consciousness: 0=alert; keenly responsive   1b. LOC questions:  0=Answers both tasks correctly   1c. LOC commands: 0=Answers both tasks correctly   2.  Best Gaze: 0=normal   3.  Visual: 0=No visual loss   4. Facial Palsy: 1=Minor paralysis (flattened nasolabial fold, asymmetric on smiling)   5a.  Motor left arm: 0=No drift, limb holds 90 (or 45) degrees for full 10 seconds   5b.  Motor right arm: 0=No drift, limb holds 90 (or 45) degrees for full 10 seconds   6a. motor left le=No drift, limb holds 90 (or 45) degrees for full 10 seconds   6b  Motor right le=No drift, limb holds 90 (or 45) degrees for full 10 seconds   7. Limb Ataxia: 0=Absent   8.  Sensory: 1=Mild to moderate sensory loss; patient feels pinprick is less sharp or is dull on the affected side; there is a loss of superficial pain with pinprick but patient is aware He is being touched   9. Best Language:  0=No aphasia, normal   10. Dysarthria: 0=Normal   11. Extinction and Inattention: 0=No abnormality   12. Distal motor function: 0=Normal    Total:   2         Plan:  -MRI brain confirms acute right basal ganglia stroke extending into corona radiata, as well as mild  evidence of cerebral amyloid angiopathy  -recommend discharge home on aspirin 81mg daily (no DAPT 2/2 CAA)  -ok to normalize blood pressure  -PT/OT/ST eval and recs  -patient will need rehab.. defer to PT need for inpatient vs outpatient   -patient will need follow up in stroke clinic with Kelsey Schmitt NP in 3 months from discharge  -stroke education for patient and family provided  -No further recommendations from stroke neurology standpoint, will sign off.  Please call with any further questions/concerns.    Patient/Family Stroke Education:  -Written information on below given to patient:   -I have discussed the patient's stroke risk factors and the importance to modify them in order to reduce the risk of future events.  Also, the importance of a healthy diet and daily exercise in order to reduce the risk of future strokes.  -I went over the usual stroke warning signs and symptoms, and the need to activate EMS (call 911) as soon as the symptoms present.  Sudden onset numbness or weakness of the face, arm, or leg; especially on one side of the body  Sudden confusion, trouble speaking, or understanding  Sudden trouble seeing in one or both eyes  Sudden trouble walking, dizziness, loss of coordination  Sudden severe headache with no known cause  -Discussed prescribed medication regime with the patient, including the indication for the medications as well as their potential side effects, and what to do in case they appear.

## 2025-03-30 LAB
ALBUMIN SERPL-MCNC: 3.3 G/DL (ref 3.4–4.8)
ALBUMIN/GLOB SERPL: 1 RATIO (ref 1.1–2)
ALP SERPL-CCNC: 61 UNIT/L (ref 40–150)
ALT SERPL-CCNC: 9 UNIT/L (ref 0–55)
ANION GAP SERPL CALC-SCNC: 5 MEQ/L
APICAL FOUR CHAMBER EJECTION FRACTION: 52 %
APICAL TWO CHAMBER EJECTION FRACTION: 61 %
AST SERPL-CCNC: 13 UNIT/L (ref 11–45)
AV INDEX (PROSTH): 0.6
AV MEAN GRADIENT: 5 MMHG
AV PEAK GRADIENT: 10 MMHG
AV VALVE AREA BY VELOCITY RATIO: 2.2 CM²
AV VALVE AREA: 2.1 CM²
AV VELOCITY RATIO: 0.63
BASOPHILS # BLD AUTO: 0.07 X10(3)/MCL
BASOPHILS NFR BLD AUTO: 1.1 %
BILIRUB SERPL-MCNC: 0.5 MG/DL
BUN SERPL-MCNC: 14.3 MG/DL (ref 8.4–25.7)
CALCIUM SERPL-MCNC: 9 MG/DL (ref 8.8–10)
CHLORIDE SERPL-SCNC: 104 MMOL/L (ref 98–111)
CO2 SERPL-SCNC: 28 MMOL/L (ref 23–31)
CREAT SERPL-MCNC: 0.63 MG/DL (ref 0.72–1.25)
CREAT/UREA NIT SERPL: 23
CV ECHO LV RWT: 0.62 CM
DOP CALC AO PEAK VEL: 1.6 M/S
DOP CALC AO VTI: 28.4 CM
DOP CALC LVOT AREA: 3.5 CM2
DOP CALC LVOT DIAMETER: 2.1 CM
DOP CALC LVOT PEAK VEL: 1 M/S
DOP CALC LVOT STROKE VOLUME: 59.2 CM3
DOP CALC MV VTI: 24.8 CM
DOP CALCLVOT PEAK VEL VTI: 17.1 CM
E WAVE DECELERATION TIME: 254 MSEC
E/A RATIO: 0.61
E/E' RATIO: 8 M/S
ECHO LV POSTERIOR WALL: 1.3 CM (ref 0.6–1.1)
EOSINOPHIL # BLD AUTO: 0.2 X10(3)/MCL (ref 0–0.9)
EOSINOPHIL NFR BLD AUTO: 3 %
ERYTHROCYTE [DISTWIDTH] IN BLOOD BY AUTOMATED COUNT: 13.7 % (ref 11.5–17)
FRACTIONAL SHORTENING: 26.2 % (ref 28–44)
GFR SERPLBLD CREATININE-BSD FMLA CKD-EPI: >60 ML/MIN/1.73/M2
GLOBULIN SER-MCNC: 3.3 GM/DL (ref 2.4–3.5)
GLUCOSE SERPL-MCNC: 91 MG/DL (ref 75–121)
HCT VFR BLD AUTO: 38.8 % (ref 42–52)
HGB BLD-MCNC: 12.9 G/DL (ref 14–18)
IMM GRANULOCYTES # BLD AUTO: 0.02 X10(3)/MCL (ref 0–0.04)
IMM GRANULOCYTES NFR BLD AUTO: 0.3 %
INTERVENTRICULAR SEPTUM: 1.3 CM (ref 0.6–1.1)
LEFT ATRIUM AREA SYSTOLIC (APICAL 2 CHAMBER): 13 CM2
LEFT ATRIUM AREA SYSTOLIC (APICAL 4 CHAMBER): 15.2 CM2
LEFT ATRIUM SIZE: 3.8 CM
LEFT ATRIUM VOLUME INDEX MOD: 21 ML/M2
LEFT ATRIUM VOLUME MOD: 39 ML
LEFT INTERNAL DIMENSION IN SYSTOLE: 3.1 CM (ref 2.1–4)
LEFT VENTRICLE DIASTOLIC VOLUME INDEX: 42.25 ML/M2
LEFT VENTRICLE DIASTOLIC VOLUME: 79 ML
LEFT VENTRICLE END DIASTOLIC VOLUME APICAL 2 CHAMBER: 75.4 ML
LEFT VENTRICLE END DIASTOLIC VOLUME APICAL 4 CHAMBER: 86.1 ML
LEFT VENTRICLE END SYSTOLIC VOLUME APICAL 2 CHAMBER: 34.5 ML
LEFT VENTRICLE END SYSTOLIC VOLUME APICAL 4 CHAMBER: 39.3 ML
LEFT VENTRICLE MASS INDEX: 107.3 G/M2
LEFT VENTRICLE SYSTOLIC VOLUME INDEX: 20.3 ML/M2
LEFT VENTRICLE SYSTOLIC VOLUME: 38 ML
LEFT VENTRICULAR INTERNAL DIMENSION IN DIASTOLE: 4.2 CM (ref 3.5–6)
LEFT VENTRICULAR MASS: 200.6 G
LV LATERAL E/E' RATIO: 5.7 M/S
LV SEPTAL E/E' RATIO: 12.3 M/S
LVED V (TEICH): 78.6 ML
LVES V (TEICH): 37.9 ML
LVOT MG: 2 MMHG
LVOT MV: 0.65 CM/S
LYMPHOCYTES # BLD AUTO: 1.26 X10(3)/MCL (ref 0.6–4.6)
LYMPHOCYTES NFR BLD AUTO: 19 %
MCH RBC QN AUTO: 30.7 PG (ref 27–31)
MCHC RBC AUTO-ENTMCNC: 33.2 G/DL (ref 33–36)
MCV RBC AUTO: 92.4 FL (ref 80–94)
MONOCYTES # BLD AUTO: 0.73 X10(3)/MCL (ref 0.1–1.3)
MONOCYTES NFR BLD AUTO: 11 %
MV MEAN GRADIENT: 2 MMHG
MV PEAK A VEL: 1.22 M/S
MV PEAK E VEL: 0.74 M/S
MV PEAK GRADIENT: 6 MMHG
MV VALVE AREA BY CONTINUITY EQUATION: 2.39 CM2
NEUTROPHILS # BLD AUTO: 4.36 X10(3)/MCL (ref 2.1–9.2)
NEUTROPHILS NFR BLD AUTO: 65.6 %
NRBC BLD AUTO-RTO: 0 %
OHS CV RV/LV RATIO: 0.76 CM
OHS LV EJECTION FRACTION SIMPSONS BIPLANE MOD: 58 %
PISA TR MAX VEL: 2.6 M/S
PLATELET # BLD AUTO: 293 X10(3)/MCL (ref 130–400)
PMV BLD AUTO: 10.7 FL (ref 7.4–10.4)
POTASSIUM SERPL-SCNC: 3.5 MMOL/L (ref 3.5–5.1)
PROT SERPL-MCNC: 6.6 GM/DL (ref 5.8–7.6)
RBC # BLD AUTO: 4.2 X10(6)/MCL (ref 4.7–6.1)
RIGHT VENTRICLE DIASTOLIC BASEL DIMENSION: 3.2 CM
RIGHT VENTRICULAR END-DIASTOLIC DIMENSION: 3.2 CM
SODIUM SERPL-SCNC: 137 MMOL/L (ref 136–145)
TDI LATERAL: 0.13 M/S
TDI SEPTAL: 0.06 M/S
TDI: 0.1 M/S
TR MAX PG: 26 MMHG
TRICUSPID ANNULAR PLANE SYSTOLIC EXCURSION: 2.09 CM
WBC # BLD AUTO: 6.64 X10(3)/MCL (ref 4.5–11.5)
Z-SCORE OF LEFT VENTRICULAR DIMENSION IN END DIASTOLE: -2.16
Z-SCORE OF LEFT VENTRICULAR DIMENSION IN END SYSTOLE: -0.29

## 2025-03-30 PROCEDURE — 25000003 PHARM REV CODE 250

## 2025-03-30 PROCEDURE — 21400001 HC TELEMETRY ROOM

## 2025-03-30 PROCEDURE — S4991 NICOTINE PATCH NONLEGEND: HCPCS

## 2025-03-30 PROCEDURE — 36415 COLL VENOUS BLD VENIPUNCTURE: CPT

## 2025-03-30 PROCEDURE — 92523 SPEECH SOUND LANG COMPREHEN: CPT

## 2025-03-30 PROCEDURE — 25000003 PHARM REV CODE 250: Performed by: HOSPITALIST

## 2025-03-30 PROCEDURE — 80053 COMPREHEN METABOLIC PANEL: CPT

## 2025-03-30 PROCEDURE — 25000003 PHARM REV CODE 250: Performed by: STUDENT IN AN ORGANIZED HEALTH CARE EDUCATION/TRAINING PROGRAM

## 2025-03-30 PROCEDURE — 85025 COMPLETE CBC W/AUTO DIFF WBC: CPT

## 2025-03-30 PROCEDURE — 63600175 PHARM REV CODE 636 W HCPCS: Performed by: HOSPITALIST

## 2025-03-30 RX ADMIN — ASPIRIN 81 MG CHEWABLE TABLET 81 MG: 81 TABLET CHEWABLE at 09:03

## 2025-03-30 RX ADMIN — NICOTINE 1 PATCH: 7 PATCH, EXTENDED RELEASE TRANSDERMAL at 11:03

## 2025-03-30 RX ADMIN — AMLODIPINE BESYLATE 5 MG: 5 TABLET ORAL at 09:03

## 2025-03-30 RX ADMIN — THIAMINE HCL TAB 100 MG 100 MG: 100 TAB at 09:03

## 2025-03-30 RX ADMIN — ALPRAZOLAM 0.25 MG: 0.25 TABLET ORAL at 09:03

## 2025-03-30 RX ADMIN — FOLIC ACID 1 MG: 1 TABLET ORAL at 09:03

## 2025-03-30 RX ADMIN — ENOXAPARIN SODIUM 40 MG: 40 INJECTION SUBCUTANEOUS at 09:03

## 2025-03-30 NOTE — PROGRESS NOTES
[facility]  Hospital Medicine Progress Note        Chief Complaint: Inpatient Follow-up for     HPI:   89-year-old male who was transferred from an outside facility secondary to left-sided weakness and left facial droop.  Weakness began suddenly night prior to arrival.  He also reported headache.  Patient did have some mild weakness prior secondary to a previous stroke.  Imaging performed at outside facility showed a included rate P1 segment.  He was transferred to [facility] neurology consultation.  Headache has subsided.  No further facial droop.  Neurology has already evaluated.  Recommending aspirin load and then maybe aspirin daily.  Checking MRI but no planned intervention at this time.  Okay to normalize his blood pressure.     Vital signs on arrival showed a elevated blood pressure home 74/91.  He was mildly tachypneic but on room air.  No labs have been drawn but reported as normal at outside facility.  He is being admitted to the hospitalist group. MRI Brain showed R BG acute CVA. Echo showed EF 55-60%, enlarged RV.. PT/OT consulted. Neurology on-board. PT OT recommending moderate intensity therapy.    Interval Hx:   Denied any new complaints. Awaiting placement.    Case was discussed with patient's nurse on the floor.    Objective/physical exam:  General: In no acute distress, afebrile  Chest: Clear to auscultation bilaterally  Heart: RRR, +S1, S2, no appreciable murmur  Abdomen: Soft, nontender, BS +  MSK: Warm, no lower extremity edema, no clubbing or cyanosis  Neurologic: Alert and oriented x4, Cranial nerve II-XII intact, Strength 5/5 in all 4 extremities    VITAL SIGNS: 24 HRS MIN & MAX LAST   Temp  Min: 97.6 °F (36.4 °C)  Max: 98.5 °F (36.9 °C) 98.1 °F (36.7 °C)   BP  Min: 115/59  Max: 154/78 (!) 115/59   Pulse  Min: 69  Max: 83  77   Resp  Min: 17  Max: 19 19   SpO2  Min: 94 %  Max: 98 % 95 %     I have reviewed the following labs:  Recent Labs   Lab 03/28/25  0882  03/29/25 0311 03/30/25 0412   WBC 6.28 6.27 6.64   RBC 4.71 4.35* 4.20*   HGB 14.2 13.4* 12.9*   HCT 43.6 39.6* 38.8*   MCV 92.6 91.0 92.4   MCH 30.1 30.8 30.7   MCHC 32.6* 33.8 33.2   RDW 13.7 13.7 13.7    306 293   MPV 10.5* 10.7* 10.7*     Recent Labs   Lab 03/28/25  1646 03/29/25 0311 03/30/25 0412   NA  --  139 137   K  --  3.7 3.5   CL  --  106 104   CO2  --  27 28   BUN  --  15.2 14.3   CREATININE 0.67* 0.64* 0.63*   CALCIUM  --  9.1 9.0   ALBUMIN  --  3.3* 3.3*   ALKPHOS  --  63 61   ALT  --  9 9   AST  --  12 13   BILITOT  --  0.5 0.5     Assessment/Plan:  Partially calcified anterior falcine meningioma  LLE weakness  Tobacco dependence  H/O: HTN, prostate cancer, SDH    No new complaints  Neurology on-board  On ASA 81 mg daily  MRI brain reviewed; Echo reviewed  PT/OT consulted; recommending moderate intensity therapy  Speech therapy cleared patient for PO intake  On Amlodipine, Folic Acid, Thiamine  Continue monitoring symptoms    VTE prophylaxis: Lovenox    Patient condition:  Stable    Anticipated discharge and Disposition:     Pending    All diagnosis and differential diagnosis have been reviewed; assessment and plan has been documented; I have personally reviewed the labs and test results that are presently available; I have reviewed the patients medication list; I have reviewed the consulting providers response and recommendations. I have reviewed or attempted to review medical records based upon their availability    All of the patient's questions have been  addressed and answered. Patient's is agreeable to the above stated plan. I will continue to monitor closely and make adjustments to medical management as needed.    Portions of this note dictated using EMR integrated voice recognition software, and may be subject to voice recognition errors not corrected at proofreading. Please contact writer for clarification if needed.     Radiology:  I have personally reviewed the following imaging  and agree with the radiologist.     Echo    Left Ventricle: The left ventricle is normal in size. There is mild   concentric hypertrophy. There is normal systolic function with a visually   estimated ejection fraction of 55 - 60%. Grade I diastolic dysfunction.    Right Ventricle: Right ventricular enlargement. Systolic function is   normal.    Left Atrium: Agitated saline study of the atrial septum is negative   after vasalva maneuver, suggesting absence of intracardiac shunt at the   atrial level.    Mitral Valve: Mildly thickened leaflets.    Tricuspid Valve: There is mild regurgitation.      John Sanchez MD  Department of Hospital Medicine   [facility]   03/30/2025

## 2025-03-30 NOTE — PT/OT/SLP EVAL
[facility]  Speech Language Pathology Department  Cognitive-Communication Evaluation    Patient Name:  Manjit Reynolds   MRN:  [id number]    Recommendations     General recommendations:  cognitive-linguistic therapy  Communication strategies:  provide increased time to answer    Discharge therapy intensity:  low  Barriers to safe discharge: decreased communication skills    History     Manjit Reynolds is a/n 89 y.o. male admitted for left-sided weakness and left facial droop.    No past medical history on file.  No past surgical history on file.    Previous level of Function  Education:high school  Occupation: retired  Lives: with spouse  Handed: Right  Home Responsibilities:  none    Imaging   Results for orders placed during the hospital encounter of 03/28/25    MRI Brain Without Contrast    Narrative  EXAMINATION:  MRI BRAIN WITHOUT CONTRAST    CLINICAL HISTORY:  Headache on left side for past couple of weeks.  Possible stroke    TECHNIQUE:  Multiplanar MRI sequences were performed of the brain without contrast.    COMPARISON:  Outside facility CT brain March 28, 2025.    FINDINGS:  The right basal ganglia extend to the corona radiata is remarkable for diffusion restriction and signal dropout on ADC map and T2 FLAIR hyperintensity and is consistent with acute nonhemorrhagic infarct.  Chronic microvascular ischemic changes are moderate with periventricular and deep white matter T2 FLAIR hyperintense signals.  There are pontine old lacunar infarcts and chronic microangiopathic ischemia.  Generalized cerebral cortical volume loss.  Bilateral posterior fossa cerebellar numerous gradient echo sequence dephasing artifacts with corresponding diffusion weighted hypointense signals may be related to amyloid angiopathy.  Similarly there are few gradient echo sequence supratentorial dephasing artifacts.  Nonspecific diffuse dural thickening.  There is left anterior parafalcine 1.2 cm altered signal  which is suspected to represent a meningioma on image 14 series 4.  There are no surrounding brain edematous changes. The sella and suprasellar areas are unremarkable.    The cerebellar tonsils are normally positioned. There is no acute intracranial hemorrhage, hydrocephalus, midline shift or mass effect. No acute extra axial fluid collections identified. The mastoid air cells are clear.  There is loss of pneumatization left mastoid air cells with pronounced mucoperiosteal thickening.  There is also lesser mucosal thickening of the right maxillary sinus and the ethmoidal air cells    Impression  1.  Right basal ganglia extend to the corona radiata acute infarct.    2.  Chronic microangiopathic ischemia and atrophy.  Pontine old lacunar infarct.    3.  Predominantly infratentorial and few supratentorial dephasing artifacts on the gradient echo sequence could be related to amyloid angiopathy.    4.  Left anterior para falcine altered signal likely represents a small meningioma.    5.  Nonspecific diffuse dural thickening which can be seen with variety of causes including inflammatory infectious causes.      Electronically signed by: Chi Castillo  Date:    03/28/2025  Time:    19:02    Subjective     Patient awake, alert, cooperative, and [place] .  Patient goals: go home   Spiritual/Cultural/Restoration Beliefs/Practices that affect care: no    Pain/Comfort: Pain Rating 1: 0/10        Objective     ORAL MUSCULATURE  Facial Movement: reduced left  Buccal Strength & Mobility: flaccid  Mandibular Strength & Mobility: WFL  Oral Labial Strength & Mobility: impaired retraction  Lingual Strength & Mobility: WFL    SPEECH PRODUCTION  Phoneme Production: adequate  Voice Quality: adequate  Voice Production: adequate  Speech Rate: appropriate  Loudness: acceptable  Respiration: WFL for speech  Resonance: adequate  Prosody: adequate  Speech Intelligibility  Known Context: Greater that 90%  Unknown Context: Greater that 90%    AUDITORY  COMPREHENSION  Following Directions:  2-Step: 100%  Yes/No Questions:  Biographical: 100%  Environmental: 100%  Simple: 100%    VERBAL EXPRESSION  Automatic Speech:  Months of the year: 100%  Confrontation Naming  Objects: 100%  Wh- Questions:  Object name: 100%  Object function: 100%    COGNITION  Orientation:  Person: yes  Place: yes  Situation: inconsistent   Attention:  Sustained: 75%  Memory:  Short Term: 75%  Long Term: 90%  Problem Solving  Functional simple: Within Functional Limits  Organization:  Divergent thinkin%  Executive Function:  Cognitive endurance: 75%    Assessment     Mild cognitive-linguistic deficits affecting safety in the environment with facial dysarthria (left side).  Skilled SLP intervention indicated to treat.    Goals     Multidisciplinary Problems       SLP Goals          Problem: SLP    Goal Priority Disciplines Outcome   SLP Goal     SLP    Description: LTG:  Pt will improve cognitive-linguistic skills and participate in OMEs to return to PLOF.  ST.  Participate in OME's and facial massage to improve left facial weakness/droop.  2.  Participate in language tasks to promote word-finding with 80% accuracy.  3.  Participate in functional memory tasks with 80% accuracy.                     Patient Education     Patient, spouse were, and daughter/s were provided with verbal education regarding communication/cognition.  Understanding was verbalized.    Plan     SLP Follow-Up:  Yes   Patient to be seen:  3 x/week   Plan of Care expires:  25  Plan of Care reviewed with:  patient, family      Time Tracking     SLP Treatment Date:   25  Speech Start Time:  1020  Speech Stop Time:  1035     Speech Total Time (min):  15 min    Billable minutes:  Evaluation of Speech Sound Production with Comprehension and Expression, 15 minutes     2025

## 2025-03-31 VITALS
BODY MASS INDEX: 20.97 KG/M2 | SYSTOLIC BLOOD PRESSURE: 125 MMHG | RESPIRATION RATE: 17 BRPM | HEIGHT: 71 IN | DIASTOLIC BLOOD PRESSURE: 71 MMHG | TEMPERATURE: 98 F | WEIGHT: 149.81 LBS | OXYGEN SATURATION: 97 % | HEART RATE: 86 BPM

## 2025-03-31 PROCEDURE — 92507 TX SP LANG VOICE COMM INDIV: CPT

## 2025-03-31 PROCEDURE — S4991 NICOTINE PATCH NONLEGEND: HCPCS

## 2025-03-31 PROCEDURE — 25000003 PHARM REV CODE 250

## 2025-03-31 PROCEDURE — 25000003 PHARM REV CODE 250: Performed by: HOSPITALIST

## 2025-03-31 PROCEDURE — 97535 SELF CARE MNGMENT TRAINING: CPT

## 2025-03-31 PROCEDURE — 97530 THERAPEUTIC ACTIVITIES: CPT

## 2025-03-31 PROCEDURE — 97116 GAIT TRAINING THERAPY: CPT

## 2025-03-31 RX ORDER — AMLODIPINE BESYLATE 5 MG/1
5 TABLET ORAL DAILY
Qty: 90 TABLET | Refills: 3 | Status: SHIPPED | OUTPATIENT
Start: 2025-04-01 | End: 2026-04-01

## 2025-03-31 RX ORDER — FOLIC ACID 1 MG/1
1 TABLET ORAL DAILY
Qty: 30 TABLET | Refills: 11 | Status: SHIPPED | OUTPATIENT
Start: 2025-04-01 | End: 2026-04-01

## 2025-03-31 RX ORDER — NAPROXEN SODIUM 220 MG/1
81 TABLET, FILM COATED ORAL DAILY
Qty: 30 TABLET | Refills: 11 | Status: SHIPPED | OUTPATIENT
Start: 2025-04-01 | End: 2026-04-01

## 2025-03-31 RX ORDER — LANOLIN ALCOHOL/MO/W.PET/CERES
100 CREAM (GRAM) TOPICAL DAILY
Qty: 30 TABLET | Refills: 6 | Status: SHIPPED | OUTPATIENT
Start: 2025-04-01

## 2025-03-31 RX ADMIN — ASPIRIN 81 MG CHEWABLE TABLET 81 MG: 81 TABLET CHEWABLE at 09:03

## 2025-03-31 RX ADMIN — FOLIC ACID 1 MG: 1 TABLET ORAL at 09:03

## 2025-03-31 RX ADMIN — THIAMINE HCL TAB 100 MG 100 MG: 100 TAB at 09:03

## 2025-03-31 RX ADMIN — AMLODIPINE BESYLATE 5 MG: 5 TABLET ORAL at 09:03

## 2025-03-31 RX ADMIN — NICOTINE 1 PATCH: 7 PATCH, EXTENDED RELEASE TRANSDERMAL at 09:03

## 2025-03-31 NOTE — PROGRESS NOTES
[facility]  Hospital Medicine Progress Note        Chief Complaint: Inpatient Follow-up for     HPI:   89-year-old male who was transferred from an outside facility secondary to left-sided weakness and left facial droop.  Weakness began suddenly night prior to arrival.  He also reported headache.  Patient did have some mild weakness prior secondary to a previous stroke.  Imaging performed at outside facility showed a included rate P1 segment.  He was transferred to [facility] neurology consultation.  Headache has subsided.  No further facial droop.  Neurology has already evaluated.  Recommending aspirin load and then maybe aspirin daily.  Checking MRI but no planned intervention at this time.  Okay to normalize his blood pressure.     Vital signs on arrival showed a elevated blood pressure home 74/91.  He was mildly tachypneic but on room air.  No labs have been drawn but reported as normal at outside facility.  He is being admitted to the hospitalist group. MRI Brain showed R BG acute CVA. Echo showed EF 55-60%, enlarged RV.. PT/OT consulted. Neurology on-board. PT OT recommending moderate intensity therapy.    Interval Hx:   Denied any new complaints. Tolerating PO intake. Awaiting placement.    Case was discussed with patient's nurse on the floor.    Objective/physical exam:  General: In no acute distress, afebrile  Chest: Clear to auscultation bilaterally  Heart: RRR, +S1, S2, no appreciable murmur  Abdomen: Soft, nontender, BS +  MSK: Warm, no lower extremity edema, no clubbing or cyanosis  Neurologic: Alert and oriented x4, Cranial nerve II-XII intact, Strength 5/5 in all 4 extremities    VITAL SIGNS: 24 HRS MIN & MAX LAST   Temp  Min: 97.1 °F (36.2 °C)  Max: 98.8 °F (37.1 °C) 98.1 °F (36.7 °C)   BP  Min: 115/59  Max: 181/65 125/71   Pulse  Min: 76  Max: 86  86   Resp  Min: 17  Max: 19 17   SpO2  Min: 95 %  Max: 97 % 97 %     I have reviewed the following labs:  Recent Labs   Lab  03/28/25 1646 03/29/25 0311 03/30/25 0412   WBC 6.28 6.27 6.64   RBC 4.71 4.35* 4.20*   HGB 14.2 13.4* 12.9*   HCT 43.6 39.6* 38.8*   MCV 92.6 91.0 92.4   MCH 30.1 30.8 30.7   MCHC 32.6* 33.8 33.2   RDW 13.7 13.7 13.7    306 293   MPV 10.5* 10.7* 10.7*     Recent Labs   Lab 03/28/25 1646 03/29/25 0311 03/30/25 0412   NA  --  139 137   K  --  3.7 3.5   CL  --  106 104   CO2  --  27 28   BUN  --  15.2 14.3   CREATININE 0.67* 0.64* 0.63*   CALCIUM  --  9.1 9.0   ALBUMIN  --  3.3* 3.3*   ALKPHOS  --  63 61   ALT  --  9 9   AST  --  12 13   BILITOT  --  0.5 0.5     Assessment/Plan:  Partially calcified anterior falcine meningioma  LLE weakness  Tobacco dependence  H/O: HTN, prostate cancer, SDH    No new complaints  Neurology on-board  On ASA 81 mg daily  MRI brain reviewed; Echo reviewed  PT/OT consulted; recommending moderate intensity therapy  Speech therapy cleared patient for PO intake  On Amlodipine, Folic Acid, Thiamine  CM on-board for placement    VTE prophylaxis: Lovenox    Patient condition:  Stable    Anticipated discharge and Disposition:     Pending    All diagnosis and differential diagnosis have been reviewed; assessment and plan has been documented; I have personally reviewed the labs and test results that are presently available; I have reviewed the patients medication list; I have reviewed the consulting providers response and recommendations. I have reviewed or attempted to review medical records based upon their availability    All of the patient's questions have been  addressed and answered. Patient's is agreeable to the above stated plan. I will continue to monitor closely and make adjustments to medical management as needed.    Portions of this note dictated using EMR integrated voice recognition software, and may be subject to voice recognition errors not corrected at proofreading. Please contact writer for clarification if needed.     Radiology:  I have personally reviewed the  following imaging and agree with the radiologist.     Echo    Left Ventricle: The left ventricle is normal in size. There is mild   concentric hypertrophy. There is normal systolic function with a visually   estimated ejection fraction of 55 - 60%. Grade I diastolic dysfunction.    Right Ventricle: Right ventricular enlargement. Systolic function is   normal.    Left Atrium: Agitated saline study of the atrial septum is negative   after vasalva maneuver, suggesting absence of intracardiac shunt at the   atrial level.    Mitral Valve: Mildly thickened leaflets.    Tricuspid Valve: There is mild regurgitation.      John Sanchez MD  Department of Hospital Medicine   [facility]   03/31/2025

## 2025-03-31 NOTE — PT/OT/SLP PROGRESS
[facility]  Speech Language Pathology Department  Therapy Progress Note    Patient Name:  Manjit Reynolds   MRN:  [id number]  Admitting Diagnosis: stroke    Recommendations     General recommendations:  cognitive-linguistic therapy  Communication strategies:  provide increased time to answer    Discharge therapy intensity:  low intensity therapy  Barriers to safe discharge: decreased communication skills    Subjective     Patient awake, alert, and cooperative.    Pain/Comfort:  0/10  Spiritual/Cultural/Confucianism Beliefs/Practices that affect care: no  Respiratory Status: room air    Objective     Therapeutic Activities:  Oral Motor Exercises:  Lingual- 50x  Labial- 50x    Assessment     Pt continues to present with mild cognitive ;linguistic impairments affecting safe, independent functioning, as well as facial dysarthria (left side). Skilled SLP intervention is warranted at this time.     Goals     Multidisciplinary Problems       SLP Goals          Problem: SLP    Goal Priority Disciplines Outcome   SLP Goal     SLP    Description: LTG:  Pt will improve cognitive-linguistic skills and participate in OMEs to return to PLOF.  ST.  Participate in OME's and facial massage to improve left facial weakness/droop.  2.  Participate in language tasks to promote word-finding with 80% accuracy.  3.  Participate in functional memory tasks with 80% accuracy.                     Patient Education     Patient provided with verbal education regarding SLP POC.  Understanding was verbalized, however additional teaching warranted.    Plan     Will continue to follow and tx as appropriate.    SLP Follow-Up:  Yes   Patient to be seen:  3 x/week   Plan of Care expires:  25  Plan of Care reviewed with:  patient, family     Time Tracking     SLP Treatment Date:   25  Speech Start Time:  0943  Speech Stop Time:  1003     Speech Total Time (min):  20 min    Billable minutes:  Speech/Language  Therapy, 20 minutes     03/31/2025

## 2025-03-31 NOTE — PT/OT/SLP PROGRESS
"Physical Therapy Treatment    Patient Name:  Manjit Reynolds   MRN:  [id number]    Recommendations:     Discharge therapy intensity: High Intensity Therapy   Discharge Equipment Recommendations: to be determined by next level of care  Barriers to discharge: Impaired mobility and Ongoing medical needs    Assessment:     Manjit Reynolds is a 89 y.o. 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 .  He presents with the following impairments/functional limitations: gait instability, impaired balance, decreased coordination, impaired self care skills.    Pt req increased assistance for ambulation today due to gait instability and impulsivity. Pt demo intermittent heel only contact on L foot with stepping causing him to slip; Min-Mod A to maintain balance. Continuing to recommend therapy beyond this stay due to high fall risk.     Rehab Prognosis: Good; patient would benefit from acute skilled PT services to address these deficits and reach maximum level of function.    Recent Surgery: * No surgery found *      Plan:     During this hospitalization, patient would benefit from acute PT services 6 x/week to address the identified rehab impairments via gait training, therapeutic activities, neuromuscular re-education and progress toward the following goals:    Plan of Care Expires:  04/29/25    Subjective     Chief Complaint: "I want to go home"  Patient/Family Comments/goals: to return home  Pain/Comfort:  Pain Rating 1: 0/10      Objective:     Communicated with nsg prior to session.  Patient found HOB elevated with peripheral IV, pulse ox " (continuous), telemetry, bed alarm upon PT entry to room.     General Precautions: Standard, fall, hearing impaired  Orthopedic Precautions: N/A  Braces: N/A  Respiratory Status: Room air  Blood Pressure: 146/78  HR: 76        Functional Mobility:  Bed Mobility:     Supine to Sit: minimum assistance  Transfers:     Sit to Stand:  minimum assistance with rolling walker  1 reps from bed   1 reps from toilet   1 rep from recliner  Gait: Pt amb 20ft+ 30ft+30ft Min-Mod A using RW.   Pt req VC to step heel toe contact due to patient not transferring weight onto heels prior to swing phase (causing him to slip/ER foot).  Mild knee buckling throughout 2/2 to weakness.   Pt req Mod A to recover from 2 bouts of lateral LOB.   Seated rest breaks provided to recovery 2/2 fatigue.   VC for directions and to slow down secondary to impulsivity    Therapeutic Activities/Exercises:      Education:  Patient provided with verbal education education regarding PT role/goals/POC, fall prevention, safety awareness, and discharge/DME recommendations.  Additional teaching is warranted.     Patient left up in chair with all lines intact, call button in reach, chair alarm on, and nsg notified    GOALS:   Multidisciplinary Problems       Physical Therapy Goals          Problem: Physical Therapy    Goal Priority Disciplines Outcome Interventions   Physical Therapy Goal     PT, PT/OT Progressing    Description: Goals to be met by: 2025     Patient will increase functional independence with mobility by performin. Supine to sit with Stand-by Assistance  2. Sit to supine with Stand-by Assistance  3. Sit to stand transfer with Stand-by Assistance  4. Bed to chair transfer with Stand-by Assistance using Rolling Walker  5. Gait  x 150 feet with Stand-by Assistance using Rolling Walker.                          Time Tracking:     PT Received On: 25  PT Start Time: 1109     PT Stop Time: 1135  PT Total Time (min): 26 min     Billable  Minutes: Gait Training 10 and Therapeutic Activity 16    Treatment Type: Treatment  PT/PTA: PT     Number of PTA visits since last PT visit: 1 03/31/2025

## 2025-03-31 NOTE — PLAN OF CARE
03/31/25 1445   Final Note   Assessment Type Discharge Planning Assessment   Anticipated Discharge Disposition Rehab   Hospital Resources/Appts/Education Provided Appointments scheduled and added to AVS   Post-Acute Status   Post-Acute Authorization Placement   Post-Acute Placement Status Set-up Complete/Auth obtained   Discharge Delays None known at this time     Pt will dc to [facility] in [place]. Pt's son in law will bring pt to the rehab

## 2025-03-31 NOTE — PT/OT/SLP PROGRESS
Occupational Therapy   Treatment    Name: Manjit Reynolds  MRN: [id number]    Recommendations:     Recommended therapy intensity at discharge: High Intensity Therapy   Discharge Equipment Recommendations:  to be determined by next level of care  Barriers to discharge:       Assessment:     Manjit Reynolds is a 89 y.o. male with a medical diagnosis of MRI brain reveals right basal ganglia acute infarct extending  into the corona radiata; chronic microangiopathic ischemia and atrophy; pontine old lacunar infarct; predominantly infratentorial and few supratentorial dephasing artifacts on the gradient echo sequence, possibly related to amyloid angiopathy; and left anterior para falcine altered signal likely represents a small meningioma.  Performance deficits affecting function are weakness, impaired endurance, impaired self care skills, impaired functional mobility, gait instability, impaired balance, decreased coordination, decreased upper extremity function.     Pt with impaired safety awareness and very impulsive during all ADL tasks. Pt requiring Mod -Max A for ADLs including oral care, toileting, and LB dressing. Pt unsafe to dc home alone at this time. Unsure if pt's wife will be able to provide as much physical assistance as pt is requiring at this time upon dc. Recommend placement at this time to improve independence and reduce burden of care.     Rehab Prognosis:  Fair; patient would benefit from acute skilled OT services to address these deficits and reach maximum level of function.       Plan:     Patient to be seen 6 x/week to address the above listed problems via self-care/home management, therapeutic activities, therapeutic exercises, neuromuscular re-education, cognitive retraining  Plan of Care Expires: 04/29/25  Plan of Care Reviewed with: patient, spouse, daughter    Subjective     Pain/Comfort:  Pain Rating 1: 0/10    Objective:     Communicated with: nursing prior to session.  Patient found HOB elevated  with bed alarm, peripheral IV, telemetry, pulse ox (continuous), Other (comments) (soiled brief) upon OT entry to room.    General Precautions: Standard, fall, hearing impaired    Orthopedic Precautions:N/A  Braces: N/A  Respiratory Status: Room air  Vital Signs: Blood Pressure: 146/78     Occupational Performance:     Bed Mobility:    Patient completed Supine to Sit with minimum assistance     Functional Mobility/Transfers:  Patient completed Sit <> Stand Transfer with minimum assistance  with  rolling walker   Patient completed Toilet Transfer Step Transfer technique with moderate assistance with  rolling walker, grab bars, and verbal and tactile cueing for safety, sequencing, and RW usage. Pt with forward flexed posture requiring cueing to correct.  Functional Mobility: impulsivity and forward flexed posture. Requiring multiple verbal and tactile cues to maintain safety with RW, sequencing.    Activities of Daily Living:  Grooming: minimum assistance to perform oral care while standing at sink; pt with forward flexed posture, required assistance to maintain static standing balance during task.  Lower Body Dressing: maximal assistance to doff and don socks; unable to don socks due to toenail length at this time.   Toileting: maximal assistance to perform hygiene; pt with soiled brief upon entry, performed hygiene and donned brief with max a overall.   Self-feeding- set up A to drink coffee while seated    Therapeutic Positioning    OT interventions performed during the course of today's session in an effort to prevent and/or reduce acquired pressure injuries:   Education was provided on benefits of and recommendations for therapeutic positioning    [facility] 6 Click ADL: 16    Patient Education:  Patient provided with verbal education education regarding fall prevention and safety awareness.  Understanding was verbalized, however additional teaching warranted.      Patient left up in chair with call button in reach  and chair alarm on.    GOALS:   Multidisciplinary Problems       Occupational Therapy Goals          Problem: Occupational Therapy    Goal Priority Disciplines Outcome Interventions   Occupational Therapy Goal     OT, PT/OT Progressing    Description: LTG: Pt will perform basic ADLs and ADL transfers with Modified independence using LRAD by discharge.    STG: to be met by 4/12    Pt will complete grooming standing at sink with LRAD with SBA.  Pt will complete UB dressing with SBA.  Pt will complete LB dressing with SBA using LRAD.  Pt will complete toileting with SBA using LRAD.  Pt will complete functional mobility to/from toilet and toilet transfer with SBA using LRAD.                         Time Tracking:     OT Date of Treatment: 03/31/25  OT Start Time: 1109  OT Stop Time: 1138  OT Total Time (min): 29 min    Billable Minutes:Self Care/Home Management 2 units    OT/AME: OT     Number of AME visits since last OT visit: 1    3/31/2025

## 2025-03-31 NOTE — CARE UPDATE
[id number] Pt's family would like pt to go to [facility] ([phone]) in [place]. Faxed referral to [facility] at [phone]. Spoke with Vicky with [facility] and pt can be dc today to their rehab. Informed pt's family. Son in law asked if he can drive pt to rehab. Dr Sanchez approved it. Faxed dc clinicals to [facility] ar [phone]. Pt's nurse can call report to the nurse at [facility] accepting pt #[phone]. Informed pt's nurse

## 2025-04-26 NOTE — DISCHARGE SUMMARY
[facility]  Hospital Medicine Discharge Summary    Admit Date: 3/28/2025  Discharge Date and Time:03/31/2025  Admitting Physician:  Team  Discharging Physician: John Sanchez MD.  Primary Care Physician: No primary care provider on file.  Consults: Hospital Medicine    Discharge Diagnoses:  Partially calcified anterior falcine meningioma  LLE weakness  Tobacco dependence  H/O: HTN, prostate cancer, SDH    Hospital Course:   89-year-old male who was transferred from an outside facility secondary to left-sided weakness and left facial droop.  Weakness began suddenly night prior to arrival.  He also reported headache.  Patient did have some mild weakness prior secondary to a previous stroke.  Imaging performed at outside facility showed a included rate P1 segment.  He was transferred to [facility] neurology consultation.  Headache has subsided.  No further facial droop.  Neurology has already evaluated.  Recommending aspirin load and then maybe aspirin daily.  Checking MRI but no planned intervention at this time.  Okay to normalize his blood pressure.     Vital signs on arrival showed a elevated blood pressure home 74/91.  He was mildly tachypneic but on room air.  No labs have been drawn but reported as normal at outside facility.  He is being admitted to the hospitalist group. MRI Brain showed R BG acute CVA. Echo showed EF 55-60%, enlarged RV.. PT/OT consulted. Neurology on-board. PT OT recommending moderate intensity therapy.    Pt was seen and examined on the day of discharge. No new complaints. Plan for DC to SNF.    Vitals:  VITAL SIGNS: 24 HRS MIN & MAX LAST   No data recorded 98.1 °F (36.7 °C)   No data recorded 125/71   No data recorded  86   No data recorded 17   No data recorded 97 %       Physical Exam:  General: In no acute distress, afebrile  Chest: Clear to auscultation bilaterally  Heart: RRR, +S1, S2, no appreciable murmur  Abdomen: Soft, nontender, BS +  MSK: Warm, no  "lower extremity edema, no clubbing or cyanosis  Neurologic: Alert and oriented x4, Cranial nerve II-XII intact, Strength 5/5 in all 4 extremities    Procedures Performed: No admission procedures for hospital encounter.     Significant Diagnostic Studies: See Full reports for all details    No results for input(s): "WBC", "RBC", "HGB", "HCT", "MCV", "MCH", "MCHC", "RDW", "PLT", "MPV", "GRAN", "LYMPH", "MONO", "BASO", "NRBC" in the last 168 hours.    No results for input(s): "NA", "K", "CL", "CO2", "ANIONGAP", "BUN", "CREATININE", "GLU", "CALCIUM", "PH", "MG", "ALBUMIN", "PROT", "ALKPHOS", "ALT", "AST", "BILITOT" in the last 168 hours.     Microbiology Results (last 7 days)       ** No results found for the last 168 hours. **             Echo    Left Ventricle: The left ventricle is normal in size. There is mild   concentric hypertrophy. There is normal systolic function with a visually   estimated ejection fraction of 55 - 60%. Grade I diastolic dysfunction.    Right Ventricle: Right ventricular enlargement. Systolic function is   normal.    Left Atrium: Agitated saline study of the atrial septum is negative   after vasalva maneuver, suggesting absence of intracardiac shunt at the   atrial level.    Mitral Valve: Mildly thickened leaflets.    Tricuspid Valve: There is mild regurgitation.         Medication List        START taking these medications      amLODIPine 5 MG tablet  Commonly known as: NORVASC  Take 1 tablet (5 mg total) by mouth once daily.     aspirin 81 MG Chew  Take 1 tablet (81 mg total) by mouth once daily.     folic acid 1 MG tablet  Commonly known as: FOLVITE  Take 1 tablet (1 mg total) by mouth once daily.     thiamine 100 MG tablet  Take 1 tablet (100 mg total) by mouth once daily.               Where to Get Your Medications        These medications were sent to [facility] - [place], [place] - [address]  [address], [facility] " [id number]      Phone: [phone]   amLODIPine 5 MG tablet  aspirin 81 MG Chew  folic acid 1 MG tablet  thiamine 100 MG tablet          Explained in detail to the patient about the discharge plan, medications, and follow-up visits. Pt understands and agrees with the treatment plan  Discharge Disposition: Rehab Facility   Discharged Condition: stable  Diet-    Medications Per DC med rec  Activities as tolerated   Follow-up Information       Kelsey Schmitt, FRANKIEP. Schedule an appointment as soon as possible for a visit in 3 month(s).    Specialty: Neurology  Why: Please follow up in stroke clinic in 3 months, with Kelsey Schmitt NP  Contact information:  [address] [place] [address] [address] [place] [address] [phone]                           For further questions contact hospitalist office    Discharge time 33 minutes    For worsening symptoms, chest pain, shortness of breath, increased abdominal pain, high grade fever, stroke or stroke like symptoms, immediately go to the nearest Emergency Room or call 911 as soon as possible.